# Patient Record
Sex: FEMALE | Race: WHITE | Employment: OTHER | URBAN - METROPOLITAN AREA
[De-identification: names, ages, dates, MRNs, and addresses within clinical notes are randomized per-mention and may not be internally consistent; named-entity substitution may affect disease eponyms.]

---

## 2017-05-13 ASSESSMENT — ENCOUNTER SYMPTOMS
SYNCOPE: 1
STIFFNESS: 1
POLYDIPSIA: 0
WEAKNESS: 1
NAUSEA: 1
DISTURBANCES IN COORDINATION: 1
LEG SWELLING: 0
WEIGHT LOSS: 1
NECK PAIN: 0
SORE THROAT: 0
SPEECH CHANGE: 0
COUGH DISTURBING SLEEP: 0
BACK PAIN: 1
SMELL DISTURBANCE: 0
FATIGUE: 1
PALPITATIONS: 0
HYPOTENSION: 1
EXERCISE INTOLERANCE: 1
ORTHOPNEA: 0
CONSTIPATION: 1
HEMOPTYSIS: 0
CLAUDICATION: 0
NIGHT SWEATS: 1
SLEEP DISTURBANCES DUE TO BREATHING: 0
SNORES LOUDLY: 0
MEMORY LOSS: 0
RESPIRATORY PAIN: 1
NUMBNESS: 1
VOMITING: 1
TASTE DISTURBANCE: 0
ABDOMINAL PAIN: 1
NECK MASS: 0
FEVER: 0
HOARSE VOICE: 1
ALTERED TEMPERATURE REGULATION: 1
JAUNDICE: 0
PARALYSIS: 0
COUGH: 0
RECTAL BLEEDING: 0
TINGLING: 1
TROUBLE SWALLOWING: 1
SINUS PAIN: 0
DIZZINESS: 1
POLYPHAGIA: 0
RECTAL PAIN: 0
JOINT SWELLING: 0
DYSPNEA ON EXERTION: 1
HEARTBURN: 0
MUSCLE CRAMPS: 1
SHORTNESS OF BREATH: 1
POSTURAL DYSPNEA: 0
SINUS CONGESTION: 1
DIARRHEA: 1
INCREASED ENERGY: 1
LOSS OF CONSCIOUSNESS: 1
LEG PAIN: 1
ARTHRALGIAS: 1
BOWEL INCONTINENCE: 0
SEIZURES: 0
LIGHT-HEADEDNESS: 1
WEIGHT GAIN: 0
BLOOD IN STOOL: 0
HYPERTENSION: 0
SPUTUM PRODUCTION: 0
TACHYCARDIA: 0
MUSCLE WEAKNESS: 1
WHEEZING: 1
TREMORS: 1
MYALGIAS: 1
CHILLS: 0
DECREASED APPETITE: 0
HEADACHES: 0
BLOATING: 1
HALLUCINATIONS: 0

## 2017-05-24 ENCOUNTER — OFFICE VISIT (OUTPATIENT)
Dept: ORTHOPEDICS | Facility: CLINIC | Age: 52
End: 2017-05-24

## 2017-05-24 VITALS — WEIGHT: 218.8 LBS | HEIGHT: 72 IN | BODY MASS INDEX: 29.64 KG/M2

## 2017-05-24 DIAGNOSIS — M53.3 SACROILIAC JOINT PAIN: Primary | ICD-10-CM

## 2017-05-24 RX ORDER — NAPROXEN SODIUM 220 MG
2 TABLET ORAL WEEKLY
COMMUNITY
Start: 2017-03-01 | End: 2018-07-25

## 2017-05-24 NOTE — LETTER
5/24/2017       RE: Noelle Queen  [address]  [facility] [address]     Dear Colleague,    Thank you for referring your patient, Noelle Queen, to the [facility] at [facility]. Please see a copy of my visit note below.    HISTORY OF PRESENT ILLNESS:  A 51-year-old female seen previously for bilateral SI joint pain.  She has a history of L4-S1 fusion with Dr. Hawthorne in 2012.  She continues to have chronic low back pain.  Her SI injections were performed under CT guidance in 2016 with 100% temporary pain relief.  She continues to have pain, left greater than right.  She has numbness in her legs that radiates down her medial thighs to her plantar feet.  She also has some midline tenderness in her spine which she is troubled by.  She has Parkinsonian symptoms and says that she would have trouble with compliance with postop spine or SI fusion restrictions.  This is a work comp injury.  She did DESMOND and was turned down for a fusion and recommended rhizotomy for her SI joint pain.      ALINE 68%.      PHYSICAL EXAMINATION:     VITAL SIGNS:  BMI 28.   GENERAL:  No acute distress, well-nourished.   BACK:  Tender to palpation over bilateral PSIS.  She has a positive Gaenslen's Imer, thigh thrust, VANIA bilaterally.  Negative pelvic gapping.  5/5 strength in plantarflexion, dorsiflexion, quad and psoas.  She has mild tenderness to palpation over her previously well-healed midline spine incision.  She reports subjective decreased sensation along the medial thighs, calf and plantar foot.      IMAGING:  None new obtained.      ASSESSMENT:     1.  Bilateral knee osteonecrosis.   2.  Parkinsonian symptoms.   3.  Status post spine fusion.   4.  Bilateral SI joint dysfunction.      PLAN:  Ms. Queen presents largely today to discuss her bilateral knee osteonecrosis.  She has been seen at an outside hospital and told that she may need total  knees.  She wishes to figure out what she should pursue with work comp as far as knee replacements or SI joint fusions.  These issues are separate and should be treated separately.  She also discussed her concerns about postoperative restrictions after SI joint fusion.  Based on this, she may not be a very good candidate for an SI fusion as if she is not able to stay compliant. She may have loosening of hardware, failure or a poor result.  A rhizotomy may be a reasonable option at this point.  We will give her a consult for Work Comp Clinic.  For her midline tenderness, she should see and discussed with Dr. Hawthorne.  She will follow up on an as-needed basis.      The patient was seen and examined with Dr. López who agrees with the assessment and plan.      Dictated by Benson Peacock MD, Resident  I saw and evaluated the patient on the day of the visit and I formulated the plan.    Answers for HPI/ROS submitted by the patient on 5/13/2017     Tye López MD

## 2017-05-24 NOTE — MR AVS SNAPSHOT
After Visit Summary   5/24/2017    Noelle Queen    MRN: [id number]           Patient Information     Date Of Birth          1965        Visit Information        Provider Department      5/24/2017 11:30 AM Tye López MD [facility]        Today's Diagnoses     Sacroiliac joint pain    -  1       Follow-ups after your visit        Additional Services     [facility]       Your provider has referred you to: [facility]. Please call [phone] to make an appointment.     Clinic is located: [facility] [address] [place] [address] [id number] 4th [place] [address]      Please complete the following questions:    Procedure/Referral: Referral Only -  Comprehensive Evaluation and Management    What is your diagnosis for the patient's pain? SacroIliitis    What are your specific questions for the pain specialist? Eval. For poss.  RFA    Are there any red flags that may impact the assessment or management of the patient? None    REGARDING OPIOID MEDICATIONS:  We will always address appropriateness of opioid pain medications. We do not prescribe on the patient's first visit and generally will not take on a prescribing role for stable chronic pain. When we do take on prescribing of opioids for chronic pain, it is in collaboration with the referring physician for an determined period of time (usually weeks to months), with an expectation that the primary physician or provider will assume the prescribing role if medications are effective at stable doses with demonstrated compliance.  Therefore, please do not assume that your prescribing responsibilities end on the day of pain clinic consultation.  Is this agreeable to you? YES      Please be aware that coverage of these services is subject to the terms and limitations of your health insurance plan.  Call member  services at your health plan with any benefit or coverage questions.      Please bring the following with you to your appointment or have sent to the [facility]:    (1) Any X-Rays, CTs or MRIs which have been performed that are not in Epic.  Contact the facility where they were done to arrange for  prior to your scheduled appointment.  Any new CT, MRI or other procedures ordered by your specialist must be performed at a [facility] facility or coordinated by your clinic's referral office.    (2) List of current medications   (3) This referral request   (4) Any documents/labs given to you for this referral                  Your next 10 appointments already scheduled     Jun 19, 2017 11:50 AM CDT   (Arrive by 11:35 AM)   New Patient Visit with Adalgisa Perry MD   [facility] ([facility])    [address] [place] [id number] [phone]              Future tests that were ordered for you today     Open Future Orders        Priority Expected Expires Ordered    [facility] REFERRAL Routine 5/24/2017 5/24/2018 5/24/2017            Who to contact     Please call your clinic at [phone] to:    Ask questions about your health    Make or cancel appointments    Discuss your medicines    Learn about your test results    Speak to your doctor   If you have compliments or concerns about an experience at your clinic, or if you wish to file a complaint, please contact [facility] Patient Relations at [phone] or email us at [email or web address]         Additional Information About Your Visit        [employer] Information     [employer] gives you secure access to your electronic health record. If you see a primary care provider, you can also send messages to your care team and make appointments. If you have questions, please call your primary care clinic.  If you do not have  "a primary care provider, please call [phone] and they will assist you.      [employer] is an electronic gateway that provides easy, online access to your medical records. With [employer], you can request a clinic appointment, read your test results, renew a prescription or communicate with your care team.     To access your existing account, please contact your [facility] or call [phone] for assistance.        Care EveryWhere ID     This is your Care EveryWhere ID. This could be used by other organizations to access your [place] medical records  [id number]        Your Vitals Were     Height BMI (Body Mass Index)                1.86 m (6' 1.23\") 28.69 kg/m2           Blood Pressure from Last 3 Encounters:   09/16/14 105/66   03/25/14 112/80   12/06/13 103/80    Weight from Last 3 Encounters:   05/24/17 99.2 kg (218 lb 12.8 oz)   06/08/16 95.7 kg (211 lb)   12/30/15 93.9 kg (207 lb)               Primary Care Provider Office Phone # Fax #    Grant Wise [phone]       [facility] [address] [place] [address]        Thank you!     Thank you for choosing [facility]  for your care. Our goal is always to provide you with excellent care. Hearing back from our patients is one way we can continue to improve our services. Please take a few minutes to complete the written survey that you may receive in the mail after your visit with us. Thank you!             Your Updated Medication List - Protect others around you: Learn how to safely use, store and throw away your medicines at www.disposemymeds.org.          This list is accurate as of: 5/24/17  1:53 PM.  Always use your most recent med list.                   Brand Name Dispense Instructions for use    aspirin 81 MG tablet      Take 81 mg by mouth daily       blood glucose monitoring lancets      1 each by In Vitro route Use to test blood sugar 3 times daily or as " directed.       CLONAZEPAM PO      Take 1 mg by mouth At Bedtime       diazepam 5 MG tablet    VALIUM     Take 5 mg by mouth as needed.       DILAUDID 2 MG tablet   Generic drug:  HYDROmorphone      Take 2 mg by mouth as needed.       fluticasone 50 MCG/ACT spray    FLONASE     Spray 2 sprays into both nostrils daily       IBUPROFEN PO          MIDODRINE HCL PO          MIRALAX PO      Take 1 tablet by mouth daily as needed       naproxen sodium 220 MG tablet    ANAPROX     2 tablets once a week       nortriptyline 25 MG capsule    PAMELOR         OMEPRAZOLE PO      Take 20 mg by mouth       * PARCOPA  MG per ODT tab   Generic drug:  carbidopa-levodopa      Take 1 tablet by mouth 3 times daily       * SINEMET CR  MG per CR tablet   Generic drug:  carbidopa-levodopa          progesterone 100 MG    ENDOMETRIN         propranolol 10 MG tablet    INDERAL     Take 10 mg by mouth as needed.       senna-docusate 8.6-50 MG per tablet    SENOKOT-S;PERICOLACE     Take 1 tablet by mouth 4 times daily       tretinoin 0.1 % cream    RETIN-A     Apply topically as needed       triamcinolone 0.1 % cream    KENALOG     Apply topically 2 times daily       ZENPEP 45378 UNITS Cpep   Generic drug:  amylase-lipase-protease          ZOFRAN ODT 4 MG ODT tab   Generic drug:  ondansetron      Take 1 tablet by mouth every 8 hours. 30 minutes before meals.       * Notice:  This list has 2 medication(s) that are the same as other medications prescribed for you. Read the directions carefully, and ask your doctor or other care provider to review them with you.

## 2017-05-24 NOTE — PROGRESS NOTES
HISTORY OF PRESENT ILLNESS:  A 51-year-old female seen previously for bilateral SI joint pain.  She has a history of L4-S1 fusion with Dr. Hawthorne in 2012.  She continues to have chronic low back pain.  Her SI injections were performed under CT guidance in 2016 with 100% temporary pain relief.  She continues to have pain, left greater than right.  She has numbness in her legs that radiates down her medial thighs to her plantar feet.  She also has some midline tenderness in her spine which she is troubled by.  She has Parkinsonian symptoms and says that she would have trouble with compliance with postop spine or SI fusion restrictions.  This is a work comp injury.  She did DESMOND and was turned down for a fusion and recommended rhizotomy for her SI joint pain.      ALINE 68%.      PHYSICAL EXAMINATION:     VITAL SIGNS:  BMI 28.   GENERAL:  No acute distress, well-nourished.   BACK:  Tender to palpation over bilateral PSIS.  She has a positive Gaenslen's Imer, thigh thrust, VANIA bilaterally.  Negative pelvic gapping.  5/5 strength in plantarflexion, dorsiflexion, quad and psoas.  She has mild tenderness to palpation over her previously well-healed midline spine incision.  She reports subjective decreased sensation along the medial thighs, calf and plantar foot.      IMAGING:  None new obtained.      ASSESSMENT:     1.  Bilateral knee osteonecrosis.   2.  Parkinsonian symptoms.   3.  Status post spine fusion.   4.  Bilateral SI joint dysfunction.      PLAN:  Ms. Queen presents largely today to discuss her bilateral knee osteonecrosis.  She has been seen at an outside hospital and told that she may need total knees.  She wishes to figure out what she should pursue with work comp as far as knee replacements or SI joint fusions.  These issues are separate and should be treated separately.  She also discussed her concerns about postoperative restrictions after SI joint fusion.  Based on this, she may not be a very good  candidate for an SI fusion as if she is not able to stay compliant. She may have loosening of hardware, failure or a poor result.  A rhizotomy may be a reasonable option at this point.  We will give her a consult for Work Comp Clinic.  For her midline tenderness, she should see and discussed with Dr. Hawthorne.  She will follow up on an as-needed basis.      The patient was seen and examined with Dr. López who agrees with the assessment and plan.      Dictated by Benson Peacock MD, Resident  I saw and evaluated the patient on the day of the visit and I formulated the plan.  Tye López MD        Answers for HPI/ROS submitted by the patient on 5/13/2017   General Symptoms: Yes  Skin Symptoms: No  HENT Symptoms: Yes  EYE SYMPTOMS: No  HEART SYMPTOMS: Yes  LUNG SYMPTOMS: Yes  INTESTINAL SYMPTOMS: Yes  URINARY SYMPTOMS: No  GYNECOLOGIC SYMPTOMS: No  BREAST SYMPTOMS: No  SKELETAL SYMPTOMS: Yes  BLOOD SYMPTOMS: No  NERVOUS SYSTEM SYMPTOMS: Yes  MENTAL HEALTH SYMPTOMS: No  Fever: No  Loss of appetite: No  Weight loss: Yes  Weight gain: No  Fatigue: Yes  Night sweats: Yes  Chills: No  Increased stress: No  Excessive hunger: No  Excessive thirst: No  Feeling hot or cold when others believe the temperature is normal: Yes  Loss of height: No  Post-operative complications: Yes  Surgical site pain: Yes  Hallucinations: No  Change in or Loss of Energy: Yes  Hyperactivity: No  Confusion: No  Ear pain: No  Ear discharge: No  Hearing loss: No  Tinnitus: Yes  Nosebleeds: No  Congestion: Yes  Sinus pain: No  Trouble swallowing: Yes   Voice hoarseness: Yes  Mouth sores: No  Sore throat: No  Tooth pain: No  Gum tenderness: No  Bleeding gums: No  Change in taste: No  Change in sense of smell: No  Dry mouth: Yes  Hearing aid used: No  Neck lump: No  Cough: No  Sputum or phlegm: No  Coughing up blood: No  Difficulty breating or shortness of breath: Yes  Snoring: No  Wheezing: Yes  Difficulty breathing on exertion: Yes  Respiratory  pain: Yes  Nighttime Cough: No  Difficulty breathing when lying flat: No  Chest pain or pressure: No  Fast or irregular heartbeat: No  Pain in legs with walking: Yes  Swelling in feet or ankles: No  Trouble breathing while lying down: No  Fingers or Toes appear blue: No  High blood pressure: No  Low blood pressure: Yes  Fainting: Yes  Murmurs: No  Chest pain on exertion: No  Chest pain at rest: No  Cramping pain in leg during exercise: No  Pacemaker: No  Varicose veins: No  Edema or swelling: No  Fast heart beat: No  Wake up at night with shortness of breath: No  Heart flutters: No  Light-headedness: Yes  Exercise intolerance: Yes  Heart burn or indigestion: No  Nausea: Yes  Vomiting: Yes  Abdominal pain: Yes  Bloating: Yes  Constipation: Yes  Diarrhea: Yes  Blood in stool: No  Black stools: No  Rectal or Anal pain: No  Fecal incontinence: No  Rectal bleeding: No  Yellowing of skin or eyes: No  Vomit with blood: No  Change in stools: No  Hemorrhoids: No  Back pain: Yes  Muscle aches: Yes  Neck pain: No  Swollen joints: No  Joint pain: Yes  Bone pain: Yes  Muscle cramps: Yes  Muscle weakness: Yes  Joint stiffness: Yes  Bone fracture: Yes  Trouble with coordination: Yes  Dizziness or trouble with balance: Yes  Fainting or black-out spells: Yes  Memory loss: No  Headache: No  Seizures: No  Speech problems: No  Tingling: Yes  Tremor: Yes  Weakness: Yes  Difficulty walking: Yes  Paralysis: No  Numbness: Yes

## 2017-06-03 ENCOUNTER — HEALTH MAINTENANCE LETTER (OUTPATIENT)
Age: 52
End: 2017-06-03

## 2017-06-08 ENCOUNTER — PRE VISIT (OUTPATIENT)
Dept: ANESTHESIOLOGY | Facility: CLINIC | Age: 52
End: 2017-06-08

## 2017-06-08 NOTE — TELEPHONE ENCOUNTER
1.  Date/reason for appt: 6/19/17 - SI Joint Pain  2.  Referring provider: Dr. López  3.  Call to patient (Yes / No - short description): no, pt is referred  4.  Previous care at / records requested from:   - [facility] -- Records and imaging in [facility]/pacs with Dr. López   - [facility] -- Records received, will forward to [facility] -- Records received, will forward to clinic   - [facility] -- Records received, will forward to clinic. Imaging in [facility]

## 2017-06-19 ASSESSMENT — ANXIETY QUESTIONNAIRES
6. BECOMING EASILY ANNOYED OR IRRITABLE: NOT AT ALL
3. WORRYING TOO MUCH ABOUT DIFFERENT THINGS: NOT AT ALL
1. FEELING NERVOUS, ANXIOUS, OR ON EDGE: NOT AT ALL
4. TROUBLE RELAXING: NOT AT ALL
5. BEING SO RESTLESS THAT IT IS HARD TO SIT STILL: NOT AT ALL
GAD7 TOTAL SCORE: 0
2. NOT BEING ABLE TO STOP OR CONTROL WORRYING: NOT AT ALL
7. FEELING AFRAID AS IF SOMETHING AWFUL MIGHT HAPPEN: NOT AT ALL

## 2017-06-20 ASSESSMENT — ENCOUNTER SYMPTOMS
COUGH: 0
COUGH DISTURBING SLEEP: 0
ARTHRALGIAS: 1
NUMBNESS: 1
DIARRHEA: 1
WHEEZING: 1
BLOOD IN STOOL: 0
JOINT SWELLING: 1
PALPITATIONS: 0
SYNCOPE: 1
SINUS CONGESTION: 0
RESPIRATORY PAIN: 1
EXERCISE INTOLERANCE: 1
BOWEL INCONTINENCE: 0
BACK PAIN: 1
TINGLING: 1
EYE REDNESS: 0
NECK MASS: 0
JAUNDICE: 0
SHORTNESS OF BREATH: 1
TREMORS: 1
HYPOTENSION: 1
MUSCLE WEAKNESS: 1
POSTURAL DYSPNEA: 0
LEG PAIN: 1
RECTAL BLEEDING: 0
PARALYSIS: 0
EYE PAIN: 0
HEADACHES: 0
SPEECH CHANGE: 0
MYALGIAS: 0
SNORES LOUDLY: 0
HYPERTENSION: 0
LIGHT-HEADEDNESS: 1
BLOATING: 1
DOUBLE VISION: 0
TROUBLE SWALLOWING: 1
SORE THROAT: 0
RECTAL PAIN: 0
CLAUDICATION: 0
LOSS OF CONSCIOUSNESS: 1
HEARTBURN: 0
ABDOMINAL PAIN: 1
DYSPNEA ON EXERTION: 1
MUSCLE CRAMPS: 1
TACHYCARDIA: 1
CONSTIPATION: 1
TASTE DISTURBANCE: 0
DISTURBANCES IN COORDINATION: 1
WEAKNESS: 1
STIFFNESS: 1
SEIZURES: 0
SINUS PAIN: 1
SMELL DISTURBANCE: 0
NAUSEA: 1
SPUTUM PRODUCTION: 0
HOARSE VOICE: 1
LEG SWELLING: 0
ORTHOPNEA: 0
MEMORY LOSS: 0
VOMITING: 1
NECK PAIN: 0
SLEEP DISTURBANCES DUE TO BREATHING: 0
EYE WATERING: 0
HEMOPTYSIS: 0
DIZZINESS: 1
EYE IRRITATION: 0

## 2017-06-20 ASSESSMENT — ANXIETY QUESTIONNAIRES
GAD7 TOTAL SCORE: 0
3. WORRYING TOO MUCH ABOUT DIFFERENT THINGS: NOT AT ALL
6. BECOMING EASILY ANNOYED OR IRRITABLE: NOT AT ALL
4. TROUBLE RELAXING: NOT AT ALL
5. BEING SO RESTLESS THAT IT IS HARD TO SIT STILL: NOT AT ALL
GAD7 TOTAL SCORE: 0
1. FEELING NERVOUS, ANXIOUS, OR ON EDGE: NOT AT ALL
7. FEELING AFRAID AS IF SOMETHING AWFUL MIGHT HAPPEN: NOT AT ALL
7. FEELING AFRAID AS IF SOMETHING AWFUL MIGHT HAPPEN: NOT AT ALL
2. NOT BEING ABLE TO STOP OR CONTROL WORRYING: NOT AT ALL
GAD7 TOTAL SCORE: 0

## 2017-06-23 ENCOUNTER — TELEPHONE (OUTPATIENT)
Dept: ANESTHESIOLOGY | Facility: CLINIC | Age: 52
End: 2017-06-23

## 2017-06-23 NOTE — TELEPHONE ENCOUNTER
Reminder call placed to pt.   Pt reminded of Provider, date and time of the appointment.   Pt was asked to arrive 15 minutes early to fill out the questionnaires.   Clinic phone number provided if pt needed to reschedule.     Emani Amaro, CMA

## 2017-06-26 ENCOUNTER — OFFICE VISIT (OUTPATIENT)
Dept: ANESTHESIOLOGY | Facility: CLINIC | Age: 52
End: 2017-06-26

## 2017-06-26 VITALS
WEIGHT: 215 LBS | BODY MASS INDEX: 29.12 KG/M2 | HEIGHT: 72 IN | DIASTOLIC BLOOD PRESSURE: 96 MMHG | HEART RATE: 110 BPM | SYSTOLIC BLOOD PRESSURE: 147 MMHG | OXYGEN SATURATION: 96 %

## 2017-06-26 DIAGNOSIS — M53.3 SACROILIAC JOINT PAIN: ICD-10-CM

## 2017-06-26 DIAGNOSIS — Z98.1 S/P LUMBAR SPINAL FUSION: ICD-10-CM

## 2017-06-26 DIAGNOSIS — M46.1 SACROILIITIS (H): Primary | ICD-10-CM

## 2017-06-26 ASSESSMENT — ENCOUNTER SYMPTOMS
TREMORS: 1
WEAKNESS: 1
SPEECH CHANGE: 0
VOMITING: 1
DIZZINESS: 1
CONSTIPATION: 1
DOUBLE VISION: 0
CLAUDICATION: 0
EYE REDNESS: 0
NAUSEA: 1
TINGLING: 1
COUGH: 0
MYALGIAS: 0
HEARTBURN: 0
BLOOD IN STOOL: 0
DIARRHEA: 1
SHORTNESS OF BREATH: 1
MEMORY LOSS: 0
ABDOMINAL PAIN: 1
HEADACHES: 0
WHEEZING: 1
PALPITATIONS: 0
NECK PAIN: 0
HEMOPTYSIS: 0
SEIZURES: 0
BACK PAIN: 1
LOSS OF CONSCIOUSNESS: 1
SORE THROAT: 0
ORTHOPNEA: 0
EYE PAIN: 0
SPUTUM PRODUCTION: 0

## 2017-06-26 ASSESSMENT — PAIN SCALES - GENERAL: PAINLEVEL: MODERATE PAIN (5)

## 2017-06-26 NOTE — PATIENT INSTRUCTIONS
1. Start taking Ibuprofen 400 mg every 6-8 hours for pain or discomfort.      2.Please call Chandrika at [phone] to schedule your procedure. She is available Monday - Friday from 9-5:30pm, if she is unavailable to take your call, please leave her a voice message with your name, birth date, and phone number and she will call you back.    Your procedure: bilateral diagnostic SI joint injection     On the day of the procedure  1. Arrive 1 hour earlier than your scheduled time, to the [facility]  Address: [address] [place] [address], [place] [address]  2. Check in on the 5th floor for your procedure    A nurse will call you 2 weeks after your procedure to follow up.    If you must reschedule your procedure more than two times, you must follow up in clinic before rescheduling again.      Patient Pre-Procedure Instructions    CAUTION - FAILURE TO FOLLOW THESE PRE-PROCEDURE INSTRUCTIONS WILL RESULT IN YOUR PROCEDURE BEING RESCHEDULED.      Pregnancy  If you are pregnant, or think that you may be pregnant, please notify our staff. This may or may not affect the ability to perform the procedure.     You MUST have a  TO TAKE YOU HOME after your procedure. Transportation by Taxi or Para-transit must have a responsible adult accompany you home (other than the ). Travel by bus or light rail is not acceptable transportation. You must provide your 's full name and contact number at time of check in.   Fasting Protocol You may have NOTHING SOLID TO EAT FOR 8 HOURS prior to arrival at the procedure area.   Broth and candy are considered solid food and require an eight hour fast.   You may have CLEAR LIQUIDS UP TO 2 HOURS prior to arrival at the clinic.   Clear liquids include water, clear fruit juice (no pulp) carbonated beverages, ice, black coffee, black tea (no milk or cream), chewing gum (un-swallowed), and/or clear jello (no fruit or milk). No alcohol containing beverages.    Medications If you take any medications,  DO NOT STOP. Take your medications as usual the morning of your procedure with a sip of water AT LEAST 2 HOURS PRIOR TO ARRIVAL.    Antibiotics If you are currently taking antibiotics, you must complete the entire dose 7 days prior to your scheduled procedure. You must be clear of any signs or symptoms of infection. If you begin antibiotics, please contact our clinic for instructions.   Fever, Chills, or Rash If you experience a fever of higher than 100 degrees, chills, rash, or open wounds during the one week prior to your procedure, please call the clinic.         Medication Hold List  **Patients under Cardiology/Neurology care should consult their provider prior to the pain procedure to verify pre-procedure medication instructions. The information below contains general guidelines.**    Blood Thinners If you are taking daily ASPIRIN, PLAVIX, OR OTHER BLOOD THINNERS SUCH AS COUMADIN/WARFARIN, we will need your prescribing doctor to sign a release permitting you to stop these medications. Once approved by your prescribing doctor - STOP ALL BLOOD THINNERS BASED ON THE TIME TABLE BELOW PRIOR TO YOUR PROCEDURE. If you have been instructed to stop WARFARIN(COUMADIN), you must have an INR lab drawn the day before your procedure. . Your INR must be within normal limits before we can perform your injection. MEDICATIONS CAN BE RESTARTED AFTER YOUR PROCEDURE.    14 DAY HOLD  Ticlid (ticlopidine)    10 DAY HOLD  Effient (Prasugel)    3 DAY HOLD  Xarelto (rivaroxaban) 7 DAY HOLD  Anacin, Bufferin, Ecotrin, Excedrin, Aggrenox (Aspirin)  Brilinta (ticagrelor)  Coumadin (Warfarin)  Pradexa (Dabigatran)  Elmiron (Pentosan)  Plavix (Clopidogrel Bisulfate)  Pletal (Cilostazol)    24 DAY HOLD  Lovenox (enoxaparin)  Agrylin (Anagrelide)        Non-steroidal Anti-inflammatories (NSAIDs) DO NOT TAKE any non-steroidal anti-inflammatory medications (NSAIDs) listed on the table below.  MEDICATIONS CAN BE RESTARTED AFTER YOUR PROCEDURE. Celebrex is OK to take and does not need to be discontinued.     Medications to stop:  3 DAY HOLD  Advil, Motrin (Ibuprofen)  Arthrotec (diciofenac sodium/misoprostol)  Clinoril (Sulindac)  Indocin (Indomethacin)  Lodine (Etodolac)  Toradol (Ketorolac)  Vicoprofen (Hydrocodone and Ibuprofen)  Voltaren (Diclotenac)    14 DAY HOLD  Daypro (Oxaprozin)  Feldene (Piroxicam)   7 DAY HOLD  Aleve (Naproxen sodium)  Darvon compound (contains aspirin)  Naprosyn (Naproxen)  Norgesic Forte (contains aspirin)  Mobic (Meloxicam)  Oruvall (Ketoprofen)  Percodan (contains aspirin)  Relafen (Nabumetone)  Salsalate  Trilisate  Vitamin E (more than 400 mg per day)  Any medication containing aspirin                  To speak with a nurse, schedule/reschedule/cancel a clinic appointment, or request a medication refill call: [phone]     You can also reach us by Chorus: [email or web address]    For refills, please call on Monday, 1 week before your medication runs out. The doctors are not always in clinic, so this gives us time to get your prescriptions ready.  Please let us know the name of the medication you are requesting a refill of.

## 2017-06-26 NOTE — LETTER
6/26/2017       RE: Noelle Queen  [address]  [facility] [address]     Dear Colleague,    Thank you for referring your patient, Noelle Queen, to the [facility] at [facility]. Please see a copy of my visit note below.    [facility] Comprehensive Chronic Pain [facility] : Consultation Note    Patient: Noelle Queen Age: 52 year old   MRN: [id number] Referred by:  Maribel     Date of Visit: June 26, 2017    Reason for consultation:    Noelle Queen is a 52 year old female who is seen in consultation today at the request of her provider,Dr. López for a comprehensive evaluation and management of pain.  Primary Care Provider is Grant Wise.  Opiate pain medications are being prescribed by - Todd.    Chief complaints:  Lower back pain     History of Present illness:     Noelle Queen is a 52 year old female with pain history as described below:     Location: bilateral lower back   Laterality: bilateral  Quality: dull aching   Radiation: thigh   Duration: 3-4 years   Severity: 6-8/10  Aggravating factors include: activities,   Relieving factors include: si joint injection,   Any bowel or bladder incontinence: none  Other associated symptoms: none    The patient has a medical history significant for  bilateral SI joint pain.  The patient did have a previous spinal fusion L4 to S1 with Dr. Hawthorne in 2012 and had gone on to fusion with this based on her available imaging studies.   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  In addition to her L4 to S1 fusion, she had a lumbar hemilaminectomy in 2007. She reportedly has  decreased sensation below the level of her knee. Unknown etiology. She had an EMG done this at [facility] system with Dr. Garcia, which was  reportedly normal.   She is here today to discuss treatment options for  her bilateral SI joint pain, which she describes as sharp and burning in nature, relieved with lying down and ice, aggravated with sitting and standing activities. She is specifically interested in SI joint radiofrequency ablation. She met with Dr. López and was told that her she is not good candidate for SI joint fusion. Xray  AP  And lateral view of the pelvis show anterior posterior fusion L4 to the sacrum with anterior interbody fusion devices in place. Bilateral posterior pedicle screws L4-L5 and S1. There is some mild sclerosis on the iliac side of the SI joints bilaterally consistent with sacroiliitis.       [place] Prescription Monitoring Program:   Reviewed. No concerns    Review of Systems:  Review of Systems   HENT: Positive for tinnitus. Negative for ear discharge, ear pain, hearing loss, nosebleeds and sore throat.    Eyes: Negative for double vision, pain and redness.   Respiratory: Positive for shortness of breath and wheezing. Negative for cough, hemoptysis and sputum production.    Cardiovascular: Negative for chest pain, palpitations, orthopnea and claudication.   Gastrointestinal: Positive for abdominal pain, constipation, diarrhea, nausea and vomiting. Negative for blood in stool, heartburn and melena.   Musculoskeletal: Positive for back pain. Negative for myalgias and neck pain.   Neurological: Positive for dizziness, tingling, tremors, loss of consciousness and weakness. Negative for speech change, seizures and headaches.   Psychiatric/Behavioral: Negative for memory loss.     Past Medical History:  No past medical history on file.    Past Surgical History:  No past surgical history on file.    Medications:  Current Outpatient Prescriptions   Medication Sig Dispense Refill     naproxen sodium (ANAPROX) 220 MG tablet 2 tablets once a week       amylase-lipase-protease (ZENPEP) 01068 UNITS CPEP        nortriptyline  (PAMELOR) 25 MG capsule        progesterone (ENDOMETRIN) 100 MG        carbidopa-levodopa (SINEMET CR)  MG per tablet        senna-docusate (SENOKOT-S;PERICOLACE) 8.6-50 MG per tablet Take 1 tablet by mouth 4 times daily       Polyethylene Glycol 3350 (MIRALAX PO) Take 1 tablet by mouth daily as needed       fluticasone (FLONASE) 50 MCG/ACT nasal spray Spray 2 sprays into both nostrils daily       OMEPRAZOLE PO Take 20 mg by mouth       CLONAZEPAM PO Take 1 mg by mouth At Bedtime        blood glucose monitoring (ULTRA THIN 30G) lancets 1 each by In Vitro route Use to test blood sugar 3 times daily or as directed.       IBUPROFEN PO        triamcinolone (KENALOG) 0.1 % cream Apply topically 2 times daily       aspirin 81 MG tablet Take 81 mg by mouth daily       tretinoin (RETIN-A) 0.1 % cream Apply topically as needed        carbidopa-levodopa (PARCOPA)  MG per disintegrating tablet Take 1 tablet by mouth 3 times daily        MIDODRINE HCL PO        HYDROmorphone (DILAUDID) 2 MG tablet Take 2 mg by mouth as needed.       diazepam (VALIUM) 5 MG tablet Take 5 mg by mouth as needed.       propranolol (INDERAL) 10 MG tablet Take 10 mg by mouth as needed.       ondansetron (ZOFRAN ODT) 4 MG disintegrating tablet Take 1 tablet by mouth every 8 hours. 30 minutes before meals.        [DISCONTINUED] dexamethasone (DECADRON) 4 MG/ML injection Inject 1 mL (4 mg) as directed See Admin Instructions for 3 doses 3 mL 0       Allergies:  Allergies   Allergen Reactions     Fluoxetine [Serotonin Reuptake Inhibitors] Other (See Comments)     Twitching/tremor     Medrol [Methylprednisolone]      Nuvigil [Armodafinil] Other (See Comments)     tremor     Sulindac Other (See Comments)     Stomach bleed       Social History:  Social History     Social History     Marital status:      Spouse name: N/A     Number of children: N/A     Years of education: N/A     Occupational History     Not on file.     Social History Main  "Topics     Smoking status: Never Smoker     Smokeless tobacco: Not on file     Alcohol use No     Drug use: Not on file     Sexual activity: Not on file     Other Topics Concern     Not on file     Social History Narrative     Social History     Social History Narrative       Family history:  No family history on file.      Physical Exam:  Vitals:    06/26/17 1047   BP: (!) 147/96   Pulse: 110   SpO2: 96%   Weight: 97.5 kg (215 lb)   Height: 1.854 m (6' 1\")       General: Awake in no apparent distress. This patient is unaccompanied in the office .  Eyes: Sclerae are anicteric. PERRLA, EOMI   Neck: supple, no masses.   Lungs: unlabored.   Heart: regular rate and rhythm   Abdomen: soft non tender.  Extremities: Pulses are well palpable, no peripheral edema.   Musculoskeletal: All muscle groups are normal in bulk and tone. The patient changes position without pain behavior. The patient walks with a normal gait. Posture is normal. Muscle strength was rated at 5/5 in all groups in the extremities. The range of motion of the lumbar spine is normal  in all directions. The spinous processes in the cervical, thoracic, and lumbar spine are midline, with no tenderness over the paraspinous muscles and facet joints. There was marked tenderness to palpation noted over the sacroiliac joints  bilaterally.  Neurologic exam:Strength 5/5 for bilateral grasp, finger abduction, wrist extension, elbow flexion, elbow extension, shoulder abduction.  Strength 5/5 for bilateral dorsiflexion, plantarflexion, great toe extension, knee extension, hip flexion. Sensation to light touch intact decreased  lower extremities distal to knee.   Psychiatric; Normal affect.   Skin: Warm and Dry.       LABORATORY VALUES:   Recent Labs   Lab Test  10/20/10   0620  10/18/10   0530  10/16/10   0540  10/15/10   0530   NA   --    --   139  138   POTASSIUM  3.4  3.9  4.1  3.6   CHLORIDE   --    --   108  106   CO2   --    --   26  25   ANIONGAP   --    --   6  " 7   GLC   --    --   94  104*   BUN   --    --   <2*  6   CR   --    --   0.66  0.64   GARETH   --    --   8.8  8.4*       CBC RESULTS:   Recent Labs   Lab Test  10/15/10   0530   WBC  4.6   RBC  3.87   HGB  13.5   HCT  41.7   MCV  108*   MCH  34.9*   MCHC  32.4   RDW  12.3   PLT  146*     Most Recent 3 INR's:No lab results found.    Diagnostic tests:        MRI of cervical/lumbar/thoracic spine showing:    ASSESSMENT/PLAN:                             ASSESSMENT:  - Sacroiliitis (H)-Xray  AP  And lateral view of the pelvis show anterior posterior fusion L4 to the sacrum with anterior interbody fusion devices in place. Bilateral posterior pedicle screws L4-L5 and S1. There is some mild sclerosis on the iliac side of the SI joints bilaterally consistent with sacroiliitis. Her symptoms are more prominent on the left.   - S/P lumbar spinal fusion- done by Dr. Hawthorne in 2012.   - h/o Parkinson disease  - Unexplained below knee numbness. No pain. EMG normal  - H/o major depressive disorder, Cluster B personality disorder    PLAN:  1. Medications.     If you are experiencing moderate to severe pain, take 400mg of ibuprofen . You may take this  every six hours as needed.  The maximum daily dose of ibuprofen is 3200 mg.  I recommend monitoring labs every 4 months while taking these medication. The monitoring labs should include creatinine and UA to monitor the kidneys, CBC to monitor the GI tract, and AST or ALT to monitor the liver function.     2. Interventional procedures:    We discussed with the patient about SI joint injections, the patient agreed to pursue. We will schedule the patient for the injections  pending insurance approval. We discussed about SI joint RFA if she gets good pain relief with SI joint injection.     3. Labs and imaging: None needed for pain management.     4. Rehab: None indicated. The patient is encouraged to stay active and to perform exercise as tolerated.      5. Psychology: No current  needs.    6. Integrated medicine: We discussed acupuncture therapy, but the patient is not interested.      7. Disposition:  The patient is advised to call clinic for follow up appointment in 3 months or earlier clinically indicated. We will see the patient for above mentioned procedure.       Assessment will be ongoing with changes in treatment as indicated.  Benefits/risks/alternatives to treatment have been reviewed and the patient has been instructed to contact this office if they have any questions or concerns.  This plan of care has been discussed with the patient and the patient is in agreement.     TOTAL TIME: I spent 60 minutes including greater than 50% face-to-face time counseling her about her diagnosis and treatment options.     Again, thank you for allowing me to participate in the care of your patient.      Sincerely,    Adalgisa Perry MD

## 2017-06-26 NOTE — NURSING NOTE
Preoperative information was discussed with patient.Patient understands that they are responsible for scheduling interventional pain procedure Patient verbalized understanding that she had to arrive to one hour prior to appointment and needs to have a  arrive with her and take her home after the procedure. She is not able to have any solid foods per mouth 8 hours before the procedure and only clear liquids 2 hours prior to arrival. Patient also understood not to take any blood thinning medications according to the schedule she was given.  Patient verbalized understanding  Patient received AVS discharge information. Information reviewed with patient, all patient questions answered and patient verbalized understanding of information received.

## 2017-06-26 NOTE — MR AVS SNAPSHOT
After Visit Summary   6/26/2017    Noelle Queen    MRN: [id number]           Patient Information     Date Of Birth          1965        Visit Information        Provider Department      6/26/2017 10:20 AM Adalgisa Perry MD [facility]        Today's Diagnoses     Sacroiliac joint pain          Care Instructions    1. Start taking Ibuprofen 400 mg every 6-8 hours for pain or discomfort.      2.Please call Chandrika at [phone] to schedule your procedure. She is available Monday - Friday from 9-5:30pm, if she is unavailable to take your call, please leave her a voice message with your name, birth date, and phone number and she will call you back.    Your procedure: bilateral diagnostic SI joint injection     On the day of the procedure  1. Arrive 1 hour earlier than your scheduled time, to the [facility]  Address: [address] [place] [id number]  2. Check in on the 5th floor for your procedure    A nurse will call you 2 weeks after your procedure to follow up.    If you must reschedule your procedure more than two times, you must follow up in clinic before rescheduling again.      Patient Pre-Procedure Instructions    CAUTION - FAILURE TO FOLLOW THESE PRE-PROCEDURE INSTRUCTIONS WILL RESULT IN YOUR PROCEDURE BEING RESCHEDULED.      Pregnancy  If you are pregnant, or think that you may be pregnant, please notify our staff. This may or may not affect the ability to perform the procedure.     You MUST have a  TO TAKE YOU HOME after your procedure. Transportation by Taxi or Para-transit must have a responsible adult accompany you home (other than the ). Travel by bus or light rail is not acceptable transportation. You must provide your 's full name and contact number at time of check in.   Fasting Protocol You may have NOTHING SOLID TO EAT FOR 8 HOURS prior to arrival at the procedure area.    Broth and candy are considered solid food and require an eight hour fast.   You may have CLEAR LIQUIDS UP TO 2 HOURS prior to arrival at the clinic.   Clear liquids include water, clear fruit juice (no pulp) carbonated beverages, ice, black coffee, black tea (no milk or cream), chewing gum (un-swallowed), and/or clear jello (no fruit or milk). No alcohol containing beverages.   Medications If you take any medications,  DO NOT STOP. Take your medications as usual the morning of your procedure with a sip of water AT LEAST 2 HOURS PRIOR TO ARRIVAL.    Antibiotics If you are currently taking antibiotics, you must complete the entire dose 7 days prior to your scheduled procedure. You must be clear of any signs or symptoms of infection. If you begin antibiotics, please contact our clinic for instructions.   Fever, Chills, or Rash If you experience a fever of higher than 100 degrees, chills, rash, or open wounds during the one week prior to your procedure, please call the clinic.         Medication Hold List  **Patients under Cardiology/Neurology care should consult their provider prior to the pain procedure to verify pre-procedure medication instructions. The information below contains general guidelines.**    Blood Thinners If you are taking daily ASPIRIN, PLAVIX, OR OTHER BLOOD THINNERS SUCH AS COUMADIN/WARFARIN, we will need your prescribing doctor to sign a release permitting you to stop these medications. Once approved by your prescribing doctor - STOP ALL BLOOD THINNERS BASED ON THE TIME TABLE BELOW PRIOR TO YOUR PROCEDURE. If you have been instructed to stop WARFARIN(COUMADIN), you must have an INR lab drawn the day before your procedure. . Your INR must be within normal limits before we can perform your injection. MEDICATIONS CAN BE RESTARTED AFTER YOUR PROCEDURE.    14 DAY HOLD  Ticlid (ticlopidine)    10 DAY HOLD  Effient (Prasugel)    3 DAY HOLD  Xarelto (rivaroxaban) 7 DAY HOLD  Anacin, Bufferin, Ecotrin,  Excedrin, Aggrenox (Aspirin)  Brilinta (ticagrelor)  Coumadin (Warfarin)  Pradexa (Dabigatran)  Elmiron (Pentosan)  Plavix (Clopidogrel Bisulfate)  Pletal (Cilostazol)    24 DAY HOLD  Lovenox (enoxaparin)  Agrylin (Anagrelide)        Non-steroidal Anti-inflammatories (NSAIDs) DO NOT TAKE any non-steroidal anti-inflammatory medications (NSAIDs) listed on the table below. MEDICATIONS CAN BE RESTARTED AFTER YOUR PROCEDURE. Celebrex is OK to take and does not need to be discontinued.     Medications to stop:  3 DAY HOLD  Advil, Motrin (Ibuprofen)  Arthrotec (diciofenac sodium/misoprostol)  Clinoril (Sulindac)  Indocin (Indomethacin)  Lodine (Etodolac)  Toradol (Ketorolac)  Vicoprofen (Hydrocodone and Ibuprofen)  Voltaren (Diclotenac)    14 DAY HOLD  Daypro (Oxaprozin)  Feldene (Piroxicam)   7 DAY HOLD  Aleve (Naproxen sodium)  Darvon compound (contains aspirin)  Naprosyn (Naproxen)  Norgesic Forte (contains aspirin)  Mobic (Meloxicam)  Oruvall (Ketoprofen)  Percodan (contains aspirin)  Relafen (Nabumetone)  Salsalate  Trilisate  Vitamin E (more than 400 mg per day)  Any medication containing aspirin                  To speak with a nurse, schedule/reschedule/cancel a clinic appointment, or request a medication refill call: [phone]     You can also reach us by [employer]: [email or web address]    For refills, please call on Monday, 1 week before your medication runs out. The doctors are not always in clinic, so this gives us time to get your prescriptions ready.  Please let us know the name of the medication you are requesting a refill of.                                   Follow-ups after your visit        Who to contact     Please call your clinic at [phone] to:    Ask questions about your health    Make or cancel appointments    Discuss your medicines    Learn about your test results    Speak to your doctor   If you have compliments or concerns about an experience at your clinic, or if you wish  "to file a complaint, please contact [facility] Patient Relations at [phone] or email us at [email or web address]         Additional Information About Your Visit        A [employer] Information     [employer] gives you secure access to your electronic health record. If you see a primary care provider, you can also send messages to your care team and make appointments. If you have questions, please call your primary care clinic.  If you do not have a primary care provider, please call [phone] and they will assist you.      [employer] is an electronic gateway that provides easy, online access to your medical records. With [employer], you can request a clinic appointment, read your test results, renew a prescription or communicate with your care team.     To access your existing account, please contact your [facility] or call [phone] for assistance.        Care EveryWhere ID     This is your Care EveryWhere ID. This could be used by other organizations to access your [place] medical records  [id number]        Your Vitals Were     Pulse Height Pulse Oximetry BMI (Body Mass Index)          110 1.854 m (6' 1\") 96% 28.37 kg/m2         Blood Pressure from Last 3 Encounters:   06/26/17 (!) 147/96   09/16/14 105/66   03/25/14 112/80    Weight from Last 3 Encounters:   06/26/17 97.5 kg (215 lb)   05/24/17 99.2 kg (218 lb 12.8 oz)   06/08/16 95.7 kg (211 lb)              We Performed the Following     [facility]ERR[facility]        Primary Care Provider Office Phone # Fax #    Grant TORRES Frandy [phone]       [facility] [address] [place] [address]        Equal Access to Services     LEWIS CHASE : Malcolm Varghese, mary ann brown, pancho sullivan, alysia ballard. So [facility] [phone].    ATENCIÓN: Si habla español, tiene a perez disposición " servicios gratuitos de asistencia lingüística. Zabrina cam [phone].    We comply with applicable federal civil rights laws and [place] laws. We do not discriminate on the basis of race, color, national origin, age, disability sex, sexual orientation or gender identity.            Thank you!     Thank you for choosing [facility]  for your care. Our goal is always to provide you with excellent care. Hearing back from our patients is one way we can continue to improve our services. Please take a few minutes to complete the written survey that you may receive in the mail after your visit with us. Thank you!             Your Updated Medication List - Protect others around you: Learn how to safely use, store and throw away your medicines at www.disposemymeds.org.          This list is accurate as of: 6/26/17 11:50 AM.  Always use your most recent med list.                   Brand Name Dispense Instructions for use Diagnosis    aspirin 81 MG tablet      Take 81 mg by mouth daily        blood glucose monitoring lancets      1 each by In Vitro route Use to test blood sugar 3 times daily or as directed.        CLONAZEPAM PO      Take 1 mg by mouth At Bedtime        diazepam 5 MG tablet    VALIUM     Take 5 mg by mouth as needed.        DILAUDID 2 MG tablet   Generic drug:  HYDROmorphone      Take 2 mg by mouth as needed.        fluticasone 50 MCG/ACT spray    FLONASE     Spray 2 sprays into both nostrils daily    Sacroiliac joint pain       IBUPROFEN PO           MIDODRINE HCL PO           MIRALAX PO      Take 1 tablet by mouth daily as needed    Sacroiliac joint pain       naproxen sodium 220 MG tablet    ANAPROX     2 tablets once a week        nortriptyline 25 MG capsule    PAMELOR          OMEPRAZOLE PO      Take 20 mg by mouth        * PARCOPA  MG per ODT tab   Generic drug:  carbidopa-levodopa      Take 1 tablet by mouth 3 times daily        * SINEMET CR  MG per CR  tablet   Generic drug:  carbidopa-levodopa           progesterone 100 MG    ENDOMETRIN          propranolol 10 MG tablet    INDERAL     Take 10 mg by mouth as needed.        senna-docusate 8.6-50 MG per tablet    SENOKOT-S;PERICOLACE     Take 1 tablet by mouth 4 times daily    Sacroiliac joint pain       tretinoin 0.1 % cream    RETIN-A     Apply topically as needed        triamcinolone 0.1 % cream    KENALOG     Apply topically 2 times daily        ZENPEP 43508 UNITS Cpep   Generic drug:  amylase-lipase-protease           ZOFRAN ODT 4 MG ODT tab   Generic drug:  ondansetron      Take 1 tablet by mouth every 8 hours. 30 minutes before meals.        * Notice:  This list has 2 medication(s) that are the same as other medications prescribed for you. Read the directions carefully, and ask your doctor or other care provider to review them with you.

## 2017-06-26 NOTE — PROGRESS NOTES
[facility] : Consultation Note    Patient: Noelle Queen Age: 52 year old   MRN: [id number] Referred by:  Maribel     Date of Visit: June 26, 2017    Reason for consultation:    Noelle Queen is a 52 year old female who is seen in consultation today at the request of her provider,Dr. López for a comprehensive evaluation and management of pain.  Primary Care Provider is Grant Wise  Opiate pain medications are being prescribed by - Todd.    Chief complaints:  Lower back pain     History of Present illness:     Noelle Queen is a 52 year old female with pain history as described below:     Location: bilateral lower back   Laterality: bilateral  Quality: dull aching   Radiation: thigh   Duration: 3-4 years   Severity: 6-8/10  Aggravating factors include: activities,   Relieving factors include: si joint injection,   Any bowel or bladder incontinence: none  Other associated symptoms: none    The patient has a medical history significant for  bilateral SI joint pain.  The patient did have a previous spinal fusion L4 to S1 with Dr. Hawthorne in 2012 and had gone on to fusion with this based on her available imaging studies.  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  In addition to her L4 to S1 fusion, she had a lumbar hemilaminectomy in 2007. She reportedly has  decreased sensation below the level of her knee. Unknown etiology. She had an EMG done this at [facility] system with Dr. Garcia, which was reportedly normal.   She is here today to discuss treatment options for  her bilateral SI joint pain, which she describes as sharp and burning in nature, relieved with lying down and ice, aggravated with sitting and standing activities. She is specifically interested in SI joint radiofrequency ablation. She met with Dr. López and  was told that her she is not good candidate for SI joint fusion. Xray  AP  And lateral view of the pelvis show anterior posterior fusion L4 to the sacrum with anterior interbody fusion devices in place. Bilateral posterior pedicle screws L4-L5 and S1. There is some mild sclerosis on the iliac side of the SI joints bilaterally consistent with sacroiliitis.       [place] Prescription Monitoring Program:   Reviewed. No concerns    Review of Systems:  Review of Systems   HENT: Positive for tinnitus. Negative for ear discharge, ear pain, hearing loss, nosebleeds and sore throat.    Eyes: Negative for double vision, pain and redness.   Respiratory: Positive for shortness of breath and wheezing. Negative for cough, hemoptysis and sputum production.    Cardiovascular: Negative for chest pain, palpitations, orthopnea and claudication.   Gastrointestinal: Positive for abdominal pain, constipation, diarrhea, nausea and vomiting. Negative for blood in stool, heartburn and melena.   Musculoskeletal: Positive for back pain. Negative for myalgias and neck pain.   Neurological: Positive for dizziness, tingling, tremors, loss of consciousness and weakness. Negative for speech change, seizures and headaches.   Psychiatric/Behavioral: Negative for memory loss.       Past Medical History:  No past medical history on file.    Past Surgical History:  No past surgical history on file.    Medications:  Current Outpatient Prescriptions   Medication Sig Dispense Refill     naproxen sodium (ANAPROX) 220 MG tablet 2 tablets once a week       amylase-lipase-protease (ZENPEP) 29678 UNITS CPEP        nortriptyline (PAMELOR) 25 MG capsule        progesterone (ENDOMETRIN) 100 MG        carbidopa-levodopa (SINEMET CR)  MG per tablet        senna-docusate (SENOKOT-S;PERICOLACE) 8.6-50 MG per tablet Take 1 tablet by mouth 4 times daily       Polyethylene Glycol 3350 (MIRALAX PO) Take 1 tablet by mouth daily as needed       fluticasone  (FLONASE) 50 MCG/ACT nasal spray Spray 2 sprays into both nostrils daily       OMEPRAZOLE PO Take 20 mg by mouth       CLONAZEPAM PO Take 1 mg by mouth At Bedtime        blood glucose monitoring (ULTRA THIN 30G) lancets 1 each by In Vitro route Use to test blood sugar 3 times daily or as directed.       IBUPROFEN PO        triamcinolone (KENALOG) 0.1 % cream Apply topically 2 times daily       aspirin 81 MG tablet Take 81 mg by mouth daily       tretinoin (RETIN-A) 0.1 % cream Apply topically as needed        carbidopa-levodopa (PARCOPA)  MG per disintegrating tablet Take 1 tablet by mouth 3 times daily        MIDODRINE HCL PO        HYDROmorphone (DILAUDID) 2 MG tablet Take 2 mg by mouth as needed.       diazepam (VALIUM) 5 MG tablet Take 5 mg by mouth as needed.       propranolol (INDERAL) 10 MG tablet Take 10 mg by mouth as needed.       ondansetron (ZOFRAN ODT) 4 MG disintegrating tablet Take 1 tablet by mouth every 8 hours. 30 minutes before meals.        [DISCONTINUED] dexamethasone (DECADRON) 4 MG/ML injection Inject 1 mL (4 mg) as directed See Admin Instructions for 3 doses 3 mL 0       Allergies:       Allergies   Allergen Reactions     Fluoxetine [Serotonin Reuptake Inhibitors] Other (See Comments)     Twitching/tremor     Medrol [Methylprednisolone]      Nuvigil [Armodafinil] Other (See Comments)     tremor     Sulindac Other (See Comments)     Stomach bleed       Social History:    Social History     Social History     Marital status:      Spouse name: N/A     Number of children: N/A     Years of education: N/A     Occupational History     Not on file.     Social History Main Topics     Smoking status: Never Smoker     Smokeless tobacco: Not on file     Alcohol use No     Drug use: Not on file     Sexual activity: Not on file     Other Topics Concern     Not on file     Social History Narrative     Social History     Social History Narrative         Family history:  No family history on  "file.      Physical Exam:  Vitals:    06/26/17 1047   BP: (!) 147/96   Pulse: 110   SpO2: 96%   Weight: 97.5 kg (215 lb)   Height: 1.854 m (6' 1\")       General: Awake in no apparent distress. This patient is unaccompanied in the office .  Eyes: Sclerae are anicteric. PERRLA, EOMI   Neck: supple, no masses.   Lungs: unlabored.   Heart: regular rate and rhythm   Abdomen: soft non tender.  Extremities: Pulses are well palpable, no peripheral edema.   Musculoskeletal: All muscle groups are normal in bulk and tone. The patient changes position without pain behavior. The patient walks with a normal gait. Posture is normal. Muscle strength was rated at 5/5 in all groups in the extremities. The range of motion of the lumbar spine is normal  in all directions. The spinous processes in the cervical, thoracic, and lumbar spine are midline, with no tenderness over the paraspinous muscles and facet joints. There was marked tenderness to palpation noted over the sacroiliac joints  bilaterally.  Neurologic exam:Strength 5/5 for bilateral grasp, finger abduction, wrist extension, elbow flexion, elbow extension, shoulder abduction.  Strength 5/5 for bilateral dorsiflexion, plantarflexion, great toe extension, knee extension, hip flexion. Sensation to light touch intact decreased  lower extremities distal to knee.   Psychiatric; Normal affect.   Skin: Warm and Dry.       LABORATORY VALUES:   Recent Labs   Lab Test  10/20/10   0620  10/18/10   0530  10/16/10   0540  10/15/10   0530   NA   --    --   139  138   POTASSIUM  3.4  3.9  4.1  3.6   CHLORIDE   --    --   108  106   CO2   --    --   26  25   ANIONGAP   --    --   6  7   GLC   --    --   94  104*   BUN   --    --   <2*  6   CR   --    --   0.66  0.64   GARETH   --    --   8.8  8.4*       CBC RESULTS:   Recent Labs   Lab Test  10/15/10   0530   WBC  4.6   RBC  3.87   HGB  13.5   HCT  41.7   MCV  108*   MCH  34.9*   MCHC  32.4   RDW  12.3   PLT  146*       Most Recent 3 INR's:No " lab results found.      Diagnostic tests:          MRI of cervical/lumbar/thoracic spine showing:    ASSESSMENT/PLAN:                             ASSESSMENT:       - Sacroiliitis (H)-Xray  AP  And lateral view of the pelvis show anterior posterior fusion L4 to the sacrum with anterior interbody fusion devices in place. Bilateral posterior pedicle screws L4-L5 and S1. There is some mild sclerosis on the iliac side of the SI joints bilaterally consistent with sacroiliitis. Her symptoms are more prominent on the left.   - S/P lumbar spinal fusion- done by Dr. Hawthorne in 2012.   - h/o Parkinson disease  - Unexplained below knee numbness. No pain. EMG normal  - H/o major depressive disorder, Cluster B personality disorder           PLAN:    1. Medications.     If you are experiencing moderate to severe pain, take 400mg of ibuprofen . You may take this  every six hours as needed.  The maximum daily dose of ibuprofen is 3200 mg.  I recommend monitoring labs every 4 months while taking these medication. The monitoring labs should include creatinine and UA to monitor the kidneys, CBC to monitor the GI tract, and AST or ALT to monitor the liver function.       2. Interventional procedures:    We discussed with the patient about SI joint injections, the patient agreed to pursue. We will schedule the patient for the injections  pending insurance approval. We discussed about SI joint RFA if she gets good pain relief with SI joint injection.     3. Labs and imaging: None needed for pain management.     4. Rehab: None indicated. The patient is encouraged to stay active and to perform exercise as tolerated.      5. Psychology: No current needs.    6. Integrated medicine: We discussed acupuncture therapy, but the patient is not interested.      7. Disposition:  The patient is advised to call clinic for follow up appointment in 3 months or earlier clinically indicated. We will see the patient for above mentioned procedure.        Assessment will be ongoing with changes in treatment as indicated.  Benefits/risks/alternatives to treatment have been reviewed and the patient has been instructed to contact this office if they have any questions or concerns.  This plan of care has been discussed with the patient and the patient is in agreement.     Adalgisa Perry MD, PHD      TOTAL TIME: I spent 60 minutes including greater than 50% face-to-face time counseling her about her diagnosis and treatment options.

## 2017-07-19 ENCOUNTER — TELEPHONE (OUTPATIENT)
Dept: ANESTHESIOLOGY | Facility: CLINIC | Age: 52
End: 2017-07-19

## 2017-07-19 NOTE — TELEPHONE ENCOUNTER
Patient was called and updated on the status of her workman's comp claim for SI joint infections. This RN will start the process of appealing the insurance decision.

## 2017-08-23 ENCOUNTER — TELEPHONE (OUTPATIENT)
Dept: ANESTHESIOLOGY | Facility: CLINIC | Age: 52
End: 2017-08-23

## 2017-08-23 NOTE — TELEPHONE ENCOUNTER
LPN called and spoke to pt regarding Prior Authorization for their procedure tomorrow with Dr. Perry.   Pt was informed that their PA is Still pending.  (For their Bilateral SI Joint injection- through work comp)  Pt asked: if we are going to be denied by Work Comp, is they anyway we can see if I can get the procedure approved through my other Supplemental insurance.     LPN informed pt that they would contact the PA department and ask them, and get back to the pt.     LPN call the PA department and left a VM, asking them to call back when available.    Marissa Schultz LPN

## 2017-08-23 NOTE — TELEPHONE ENCOUNTER
----- Message from Cate Ortiz sent at 8/23/2017 11:17 AM CDT -----  Regarding: Patient call  Darinel Hamny - here is the patient looking for the call back regarding the status of her approval for tomorrow. She said to use the home number. Thanks!

## 2017-08-23 NOTE — TELEPHONE ENCOUNTER
LPN was able to follow up with PA department with patient's questions. PA team stated that its best to wait for a denial, and then we can run it through the supplimental Insurance.     LPN updated pt, however pt changed their mind and decided that she did not want to run the PA through her other insurance anymore and if her current PA gets denied she would think about how she would like to proceed.     Pt stated that she would like to cancel procedure tomorrow, and will reschedule after hearing back about the PA for the Bilateral SI Joint injection.     LPN canceled procedure for 8/24/17 with Dr. Perry.    Marissa Schultz LPN

## 2017-08-25 ENCOUNTER — TELEPHONE (OUTPATIENT)
Dept: ANESTHESIOLOGY | Facility: CLINIC | Age: 52
End: 2017-08-25

## 2017-08-25 NOTE — TELEPHONE ENCOUNTER
This RN called the patient to update her that we have not heard anything on the status of her SI joint injections through workman's comp but the process is still ongoing. We will update the patient when information becomes available.

## 2017-09-27 ENCOUNTER — TELEPHONE (OUTPATIENT)
Dept: ANESTHESIOLOGY | Facility: CLINIC | Age: 52
End: 2017-09-27

## 2017-09-27 NOTE — TELEPHONE ENCOUNTER
Call back about work comp questions.  Dr. Perry will be updated.     RNCC spoke with Dr. Perry.  MD recommends pt call work comp to address the next step in the appeals process, as we have already submitted an appeal on pt's behalf.  Pt agreeable to this recommendation.  Pt asking if diagnostic blocks performed within the past three years with Dr. López can be sufficient for Dr. Perry to do the rhizotomy without repeating the dx medial branch nerve blocks.  RNCC agreed to consult with Dr. Perry and follow up with pt.

## 2017-09-27 NOTE — TELEPHONE ENCOUNTER
----- Message from Dallin Cuellar RN sent at 9/22/2017  3:02 PM CDT -----  Regarding: FW: Appeal Denied _[place]_  Contact: [phone]      ----- Message -----     From: Wilmer Melgar     Sent: 9/22/2017   1:10 PM       To: Adult Chronic Pain Nurses-[facility]  Subject: Appeal Denied _[place]_                            Noelle reports that [employer] turned down the Bilateral SI Injections ordered by Dr. Perry.  What would be the next step?  To do a 2nd Appeal?    Noelle can be reached at [phone] to discuss.

## 2017-10-25 NOTE — TELEPHONE ENCOUNTER
Call back to pt regarding questions.  Dr. Perry confirmed with RNCC that dx blocks must be done again before moving forward with a rhizotomy.  Pt updated with Dr. Perry's response.  Pt advised to contact workman's comp to discuss authorization of dx blocks.  Pt encouraged to contact clinic if needed. Pt verbalized understanding.     Argentina Morrow, RN, BSN

## 2017-11-20 LAB — MAMMOGRAM: NORMAL

## 2018-07-11 ASSESSMENT — ENCOUNTER SYMPTOMS
MUSCLE CRAMPS: 1
SMELL DISTURBANCE: 0
LEG PAIN: 0
SINUS CONGESTION: 0
MYALGIAS: 0
MUSCLE WEAKNESS: 1
SORE THROAT: 0
POLYDIPSIA: 0
NUMBNESS: 1
DECREASED APPETITE: 0
HOT FLASHES: 1
WHEEZING: 1
ORTHOPNEA: 0
HEMOPTYSIS: 0
HOARSE VOICE: 1
COUGH DISTURBING SLEEP: 0
ALTERED TEMPERATURE REGULATION: 0
LOSS OF CONSCIOUSNESS: 1
ARTHRALGIAS: 1
WEIGHT LOSS: 0
SLEEP DISTURBANCES DUE TO BREATHING: 0
TASTE DISTURBANCE: 0
PALPITATIONS: 0
DECREASED LIBIDO: 0
TROUBLE SWALLOWING: 1
SINUS PAIN: 0
FATIGUE: 1
WEAKNESS: 1
LIGHT-HEADEDNESS: 1
EXERCISE INTOLERANCE: 1
SPEECH CHANGE: 0
SHORTNESS OF BREATH: 1
HYPOTENSION: 1
POSTURAL DYSPNEA: 0
HYPERTENSION: 0
BACK PAIN: 1
SNORES LOUDLY: 0
NECK MASS: 0
WEIGHT GAIN: 0
NECK PAIN: 0
JOINT SWELLING: 1
DISTURBANCES IN COORDINATION: 1
MEMORY LOSS: 0
FEVER: 0
HEADACHES: 0
DYSPNEA ON EXERTION: 1
SPUTUM PRODUCTION: 0
DIZZINESS: 0
CHILLS: 0
SEIZURES: 0
HALLUCINATIONS: 0
INCREASED ENERGY: 0
STIFFNESS: 1
COUGH: 0
PARALYSIS: 0
TINGLING: 1
SYNCOPE: 1
NIGHT SWEATS: 1
POLYPHAGIA: 0
TREMORS: 1

## 2018-07-23 DIAGNOSIS — M54.9 BACK PAIN: Primary | ICD-10-CM

## 2018-07-25 ENCOUNTER — RADIANT APPOINTMENT (OUTPATIENT)
Dept: GENERAL RADIOLOGY | Facility: CLINIC | Age: 53
End: 2018-07-25
Attending: ORTHOPAEDIC SURGERY
Payer: OTHER MISCELLANEOUS

## 2018-07-25 ENCOUNTER — OFFICE VISIT (OUTPATIENT)
Dept: ORTHOPEDICS | Facility: CLINIC | Age: 53
End: 2018-07-25
Payer: OTHER MISCELLANEOUS

## 2018-07-25 VITALS — HEIGHT: 72 IN | BODY MASS INDEX: 29.12 KG/M2 | WEIGHT: 215 LBS

## 2018-07-25 DIAGNOSIS — M46.1 SACROILIITIS (H): Primary | ICD-10-CM

## 2018-07-25 DIAGNOSIS — M54.9 BACK PAIN: ICD-10-CM

## 2018-07-25 NOTE — NURSING NOTE
"Reason For Visit:   Chief Complaint   Patient presents with     RECHECK     WC. F/U sacroillitis. Discuss spinal fusion     Primary MD: Grant Wise  Ref. MD: Dr. Hawthorne  Neurologist: Dr. 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  Work status?  On disability.  Date of injury: 9/13/06  Type of injury: WC., stood up from a chair  Date of surgery: 2007, lumbar hemilaminectomy                           10/5/12, lumbar fusion with Dr. Hawthorne     Smoker: No        Ht 1.854 m (6' 1\")  Wt 97.5 kg (215 lb)  BMI 28.37 kg/m2    Pain Assessment  Patient Currently in Pain: Yes  0-10 Pain Scale: 7  Primary Pain Location: Buttocks  Pain Orientation: Right, Left  Pain Descriptors: Discomfort, Aching    Oswestry (ALINE) Questionnaire    OSWESTRY DISABILITY INDEX 7/11/2018   Count 9   Sum 33   Oswestry Score (%) 73.33   Some recent data might be hidden              Visual Analog Pain Scale  Back Pain Scale 0-10: 8  Right leg pain: 0  Left leg pain: 0    Promis 10 Assessment    PROMIS 10 7/11/2018   In general, would you say your health is: Good   In general, would you say your quality of life is: Fair   In general, how would you rate your physical health? Good   In general, how would you rate your mental health, including your mood and your ability to think? Very good   In general, how would you rate your satisfaction with your social activities and relationships? Fair   In general, please rate how well you carry out your usual social activities and roles Fair   To what extent are you able to carry out your everyday physical activities such as walking, climbing stairs, carrying groceries, or moving a chair? A little   How often have you been bothered by emotional problems such as feeling anxious, depressed or irritable? Never   How would you rate your fatigue on average? Moderate   How would you rate your pain on average?   0 = No Pain  to  10 = Worst Imaginable Pain 9   Global Physical Health Score : " Raw Score -   Global Mental Health Score : Raw Score -   Total (Physical + Mental Health Score) -   In general, would you say your health is: 3   In general, would you say your quality of life is: 2   In general, how would you rate your physical health? 3   In general, how would you rate your mental health, including your mood and your ability to think? 4   In general, how would you rate your satisfaction with your social activities and relationships? 2   In general, please rate how well you carry out your usual social activities and roles. (This includes activities at home, at work and in your community, and responsibilities as a parent, child, spouse, employee, friend, etc.) 2   To what extent are you able to carry out your everyday physical activities such as walking, climbing stairs, carrying groceries, or moving a chair? 2   In the past 7 days, how often have you been bothered by emotional problems such as feeling anxious, depressed, or irritable? 1   In the past 7 days, how would you rate your fatigue on average? 3   In the past 7 days, how would you rate your pain on average, where 0 means no pain, and 10 means worst imaginable pain? 9   Global Mental Health Score 13   Global Physical Health Score 10   PROMIS TOTAL - SUBSCORES 23   Some recent data might be hidden                Isabella Ortega LPN

## 2018-07-25 NOTE — PROGRESS NOTES
[facility]  Orthopedics  Tye López MD  2018     Name: Noelle Queen  MRN: [id number]  Age: 53 year old  : 1965  Referring provider: Referred Self    REASON FOR VISIT: Bilateral low back and SI joint pain    PRIMARY CARE PHYSICIAN: Grant Wise    HISTORY OF PRESENT ILLNESS: Noelle Queen is a 53 year old female who is seen as a follow up for bilateral low back and SI joint pain. She has a history of L4-S1 fusion in  by Dr. Hawthorne. Patient was last seen in Spine clinic on 17 for ongoing bilateral low back and SI joint pain. Since then, she reports having dorsal rami blocks on  and , S1 - S4. Was planning to have an RFA at that time, however her workers comp would not cover this. Although, patient now reports that her workers comp will be able to cover an SI joint surgery. 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 Her pain is worse on the left compared to the right. Patient also has complaints of intermittent numbness and tingling down bilateral lower extremities, exacerbated by prolonged standing.     She also endorses an increase of fainting episodes as of late, reporting that she loses consciousness secondary to pain about 3-5 times a month. Of note, she is on 6 mg oral dilaudid daily.     In regards to surgery, patient endorses being heterozygous for factor V Leiden; she takes a baby aspirin daily.       Oswestry score: 73.33  PROMIS Score: 23  Pain scale: 7    PHYSICAL EXAMINATION:No new exam performed today.    IMAGING: XR Pelvis G/E 3 Views  COMPARISON FILMS: 16  RADIOGRAPHIC FINDINGS: Full radiological report in chart. Upsloped sacral dysmorphism. The results were reviewed with the patient    CLINICAL ASSESSMENT:   SI joint dysfunction    PLAN:   An 53 year old female with ongoing SI joint dysfunction. As her insurance is now willing to cover an SI  surgical procedure, I think that plan at this time is for surgical intervention with a left SI fusion. I discussed with the patient the risks of surgery, including, but not limited to death, infection, dural tear, cauda equina syndrome, paralysis, nerve injury, and non-resolution of symptoms and the patient wishes to proceed. I would like her to be seen in the PAC clinic to assess her risks for surgery, especially given her complaints of worsening fainting episodes and with her history of being heterozygous for factor V Leiden. The patient will be scheduled at the soonest possible time. Patient verbalized their understanding and agreed with the plan.     All questions were addressed and answered.     Answers for HPI/ROS submitted by the patient on 7/11/2018   General Symptoms: Yes  Skin Symptoms: No  HENT Symptoms: Yes  EYE SYMPTOMS: No  HEART SYMPTOMS: Yes  LUNG SYMPTOMS: Yes  INTESTINAL SYMPTOMS: No  URINARY SYMPTOMS: No  GYNECOLOGIC SYMPTOMS: Yes  BREAST SYMPTOMS: No  SKELETAL SYMPTOMS: Yes  BLOOD SYMPTOMS: No  NERVOUS SYSTEM SYMPTOMS: Yes  MENTAL HEALTH SYMPTOMS: No  Fever: No  Loss of appetite: No  Weight loss: No  Weight gain: No  Fatigue: Yes  Night sweats: Yes  Chills: No  Increased stress: No  Excessive hunger: No  Excessive thirst: No  Feeling hot or cold when others believe the temperature is normal: No  Loss of height: No  Post-operative complications: No  Surgical site pain: No  Hallucinations: No  Change in or Loss of Energy: No  Hyperactivity: No  Confusion: No  Ear pain: No  Ear discharge: No  Hearing loss: No  Tinnitus: Yes  Nosebleeds: No  Congestion: No  Sinus pain: No  Trouble swallowing: Yes   Voice hoarseness: Yes  Mouth sores: No  Sore throat: No  Tooth pain: No  Gum tenderness: No  Bleeding gums: No  Change in taste: No  Change in sense of smell: No  Dry mouth: No  Hearing aid used: No  Neck lump: No  Cough: No  Sputum or phlegm: No  Coughing up blood: No  Difficulty breathing or shortness of  breath: Yes  Snoring: No  Wheezing: Yes  Difficulty breathing on exertion: Yes  Nighttime Cough: No  Difficulty breathing when lying flat: No  Chest pain or pressure: No  Fast or irregular heartbeat: No  Pain in legs with walking: No  Trouble breathing while lying down: No  Fingers or toes appear blue: No  High blood pressure: No  Low blood pressure: Yes  Fainting: Yes  Murmurs: No  Pacemaker: No  Varicose veins: No  Edema or swelling: No  Wake up at night with shortness of breath: No  Light-headedness: Yes  Exercise intolerance: Yes  Back pain: Yes  Muscle aches: No  Neck pain: No  Swollen joints: Yes  Joint pain: Yes  Bone pain: Yes  Muscle cramps: Yes  Muscle weakness: Yes  Joint stiffness: Yes  Bone fracture: No  Trouble with coordination: Yes  Dizziness or trouble with balance: No  Fainting or black-out spells: Yes  Memory loss: No  Headache: No  Seizures: No  Speech problems: No  Tingling: Yes  Tremor: Yes  Weakness: Yes  Difficulty walking: Yes  Paralysis: No  Numbness: Yes  Bleeding or spotting between periods: No  Heavy or painful periods: No  Irregular periods: No  Vaginal discharge: No  Hot flashes: Yes  Vaginal dryness: No  Genital ulcers: No  Reduced libido: No  Painful intercourse: No  Difficulty with sexual arousal: No  Post-menopausal bleeding: No      Scribe Disclosure:   I, Sunil Valenzuela, am serving as a scribe to document services personally performed by Tye López MD at this visit, based upon the provider's statements to me. All documentation has been reviewed by the aforementioned provider prior to being entered into the official medical record.     Portions of this medical record were completed by a scribe. UPON MY REVIEW AND AUTHENTICATION BY ELECTRONIC SIGNATURE, this confirms (a) I performed the applicable clinical services, and (b) the record is accurate.      Total contact time >25 min    Respectfully,  Tye López MD    CC  Copy to patient     [address] [facility]  [place]  [facility] [address]

## 2018-07-25 NOTE — NURSING NOTE
Teaching Flowsheet   Relevant Diagnosis: SacroIliitis  Teaching Topic: preop Fusion Left      Person(s) involved in teaching:   Patient     Motivation Level:  Asks Questions: Yes  Eager to Learn: Yes  Cooperative: Yes  Receptive (willing/able to accept information): Yes  Any cultural factors/Bahai beliefs that may influence understanding or compliance? No       Patient demonstrates understanding of the following:  Reason for the appointment, diagnosis and treatment plan: Yes  Knowledge of proper use of medications and conditions for which they are ordered (with special attention to potential side effects or drug interactions): Yes  Which situations necessitate calling provider and whom to contact: Yes       Teaching Concerns Addressed:        Proper use and care of meds. (medical equip, care aids, etc.): Yes  Nutritional needs and diet plan: Yes  Pain management techniques: Yes  Wound Care: Yes  How and/when to access community resources: Yes     Instructional Materials Used/Given: preop pkt     Time spent with patient: 15 minutes.

## 2018-07-25 NOTE — LETTER
2018       RE: Noelle Queen  [address]  [place] [address]     Dear Colleague,    Thank you for referring your patient, Noelle Queen, to the [facility] at [facility]. Please see a copy of my visit note below.    [facility]  Orthopedics  Tye López MD  2018     Name: Noelle Queen  MRN: [id number]  Age: 53 year old  : 1965  Referring provider: Referred Self    REASON FOR VISIT: Bilateral low back and SI joint pain    PRIMARY CARE PHYSICIAN: Grant Wise    HISTORY OF PRESENT ILLNESS: Noelle Queen is a 53 year old female who is seen as a follow up for bilateral low back and SI joint pain. She has a history of L4-S1 fusion in  by Dr. Hawthorne. Patient was last seen in Spine clinic on 17 for ongoing bilateral low back and SI joint pain. Since then, she reports having dorsal rami blocks on  and , S1 - S4. Was planning to have an RFA at that time, however her workers comp would not cover this. Although, patient now reports that her workers comp will be able to cover an SI joint surgery. 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 Her pain is worse on the left compared to the right. Patient also has complaints of intermittent numbness and tingling down bilateral lower extremities, exacerbated by prolonged standing.     She also endorses an increase of fainting episodes as of late, reporting that she loses consciousness secondary to pain about 3-5 times a month. Of note, she is on 6 mg oral dilaudid daily.     In regards to surgery, patient endorses being heterozygous for factor V Leiden; she takes a baby aspirin daily.       Oswestry score: 73.33  PROMIS Score: 23  Pain scale: 7    PHYSICAL EXAMINATION:No new exam performed today.    IMAGING: XR Pelvis G/E 3 Views  COMPARISON  FILMS: 6/8/16  RADIOGRAPHIC FINDINGS: Full radiological report in chart. Upsloped sacral dysmorphism. The results were reviewed with the patient    CLINICAL ASSESSMENT:   SI joint dysfunction    PLAN:   An 53 year old female with ongoing SI joint dysfunction. As her insurance is now willing to cover an SI surgical procedure, I think that plan at this time is for surgical intervention with a left SI fusion. I discussed with the patient the risks of surgery, including, but not limited to death, infection, dural tear, cauda equina syndrome, paralysis, nerve injury, and non-resolution of symptoms and the patient wishes to proceed. I would like her to be seen in the PAC clinic to assess her risks for surgery, especially given her complaints of worsening fainting episodes and with her history of being heterozygous for factor V Leiden. The patient will be scheduled at the soonest possible time. Patient verbalized their understanding and agreed with the plan.     All questions were addressed and answered.     Scribe Disclosure:   I, Sunil Valenzuela, am serving as a scribe to document services personally performed by Tye López MD at this visit, based upon the provider's statements to me. All documentation has been reviewed by the aforementioned provider prior to being entered into the official medical record.     Portions of this medical record were completed by a scribe. UPON MY REVIEW AND AUTHENTICATION BY ELECTRONIC SIGNATURE, this confirms (a) I performed the applicable clinical services, and (b) the record is accurate.      Total contact time >25 min    Respectfully,  Tye López MD    CC  Copy to patient     [address] [address]

## 2018-07-25 NOTE — MR AVS SNAPSHOT
After Visit Summary   7/25/2018    Noelle Queen    MRN: [id number]           Patient Information     Date Of Birth          1965        Visit Information        Provider Department      7/25/2018 9:45 AM Tye López MD [facility]        Today's Diagnoses     Sacroiliitis (H)    -  1       Follow-ups after your visit        Additional Services     PAC Visit Referral (For [facility] Only)       Does this visit require an Anesthesia consult?  Yes - Evaluate for medical necessity related to one of the following conditions:  Has Parkinsons & Factor V Leiden.  On Baby ASA.  Family HX. Of PE & Clot. Need Hematology records from [facility].    H&P done by:  PAC      Please be aware that coverage of these services is subject to the terms and limitations of your health insurance plan.  Call member services at your health plan with any benefit or coverage questions.      Please bring the following to your appointment:  >>   Any x-rays, CTs or MRIs which have been performed.  Contact the facility where they were done to arrange for  prior to your scheduled appointment.  Any new CT, MRI or other procedures ordered by your specialist must be performed at a [place] facility or coordinated by your clinic's referral office.    >>   List of current medications  >>   This referral request   >>   Any documents/labs given to you for this referral            PHYSICAL THERAPY REFERRAL (External-Prints)       Physical Therapy Referral                  Who to contact     Please call your clinic at [phone] to:    Ask questions about your health    Make or cancel appointments    Discuss your medicines    Learn about your test results    Speak to your doctor            Additional Information About Your Visit        MyChart Information     [employer] gives you secure access to your electronic health record. If you see a primary care provider, you can also send messages to your care team  "and make appointments. If you have questions, please call your primary care clinic.  If you do not have a primary care provider, please call [phone] and they will assist you.      [employer] is an electronic gateway that provides easy, online access to your medical records. With [employer], you can request a clinic appointment, read your test results, renew a prescription or communicate with your care team.     To access your existing account, please contact your [facility] or call [phone] for assistance.        Care EveryWhere ID     This is your Care EveryWhere ID. This could be used by other organizations to access your [facility] medical records  [id number]        Your Vitals Were     Height BMI (Body Mass Index)                1.854 m (6' 1\") 28.37 kg/m2           Blood Pressure from Last 3 Encounters:   06/26/17 (!) 147/96   09/16/14 105/66   03/25/14 112/80    Weight from Last 3 Encounters:   07/25/18 97.5 kg (215 lb)   06/26/17 97.5 kg (215 lb)   05/24/17 99.2 kg (218 lb 12.8 oz)              We Performed the Following     PAC Visit Referral (For [facility] Only)     Shirley-Operative Worksheet     PHYSICAL THERAPY REFERRAL (External-Prints)        Primary Care Provider Office Phone # Fax #    Isaias Mcmahon [phone]       [facility] [address] [place] [address]        Equal Access to Services     LEWIS CHASE AH: Hadii bety wolffo Sodusty, waaxda luqadaha, qaybta kaalmada adeegyada, alysia ballard. So [facility] [phone].    ATENCIÓN: Si habla español, tiene a perez disposición servicios gratuitos de asistencia lingüística. Llame al [phone].    We comply with applicable federal civil rights laws and [place] laws. We do not discriminate on the basis of race, color, national origin, age, disability, sex, sexual orientation, or gender identity.            Thank you!     Thank you for choosing " [facility]  for your care. Our goal is always to provide you with excellent care. Hearing back from our patients is one way we can continue to improve our services. Please take a few minutes to complete the written survey that you may receive in the mail after your visit with us. Thank you!             Your Updated Medication List - Protect others around you: Learn how to safely use, store and throw away your medicines at www.disposemymeds.org.          This list is accurate as of 7/25/18  1:31 PM.  Always use your most recent med list.                   Brand Name Dispense Instructions for use Diagnosis    aspirin 81 MG tablet      Take 81 mg by mouth daily        blood glucose monitoring lancets      1 each by In Vitro route Use to test blood sugar 3 times daily or as directed.        CLONAZEPAM PO      Take 1 mg by mouth At Bedtime        diazepam 5 MG tablet    VALIUM     Take 5 mg by mouth as needed.        DILAUDID 2 MG tablet   Generic drug:  HYDROmorphone      Take 2 mg by mouth as needed.        fluticasone 50 MCG/ACT spray    FLONASE     Spray 2 sprays into both nostrils daily    Sacroiliac joint pain       MIDODRINE HCL PO           MIRALAX PO      Take 1 tablet by mouth daily as needed    Sacroiliac joint pain       nortriptyline 25 MG capsule    PAMELOR          * PARCOPA  MG per ODT tab   Generic drug:  carbidopa-levodopa      Take 1 tablet by mouth 3 times daily        * SINEMET CR  MG per CR tablet   Generic drug:  carbidopa-levodopa           propranolol 10 MG tablet    INDERAL     Take 10 mg by mouth as needed.        senna-docusate 8.6-50 MG per tablet    SENOKOT-S;PERICOLACE     Take 1 tablet by mouth 4 times daily    Sacroiliac joint pain       STIOLTO RESPIMAT IN           ZENPEP 93654 units Cpep   Generic drug:  amylase-lipase-protease           ZOFRAN ODT 4 MG ODT tab   Generic drug:  ondansetron      Take 1 tablet by mouth every 8 hours. 30 minutes before meals.         * Notice:  This list has 2 medication(s) that are the same as other medications prescribed for you. Read the directions carefully, and ask your doctor or other care provider to review them with you.

## 2018-09-06 ENCOUNTER — THERAPY VISIT (OUTPATIENT)
Dept: PHYSICAL THERAPY | Facility: CLINIC | Age: 53
End: 2018-09-06
Payer: OTHER MISCELLANEOUS

## 2018-09-06 DIAGNOSIS — M53.3 SACROILIAC JOINT PAIN: Primary | ICD-10-CM

## 2018-09-06 PROCEDURE — 97530 THERAPEUTIC ACTIVITIES: CPT | Mod: GP | Performed by: PHYSICAL THERAPIST

## 2018-09-06 PROCEDURE — 97116 GAIT TRAINING THERAPY: CPT | Mod: GP | Performed by: PHYSICAL THERAPIST

## 2018-09-06 PROCEDURE — 97163 PT EVAL HIGH COMPLEX 45 MIN: CPT | Mod: GP | Performed by: PHYSICAL THERAPIST

## 2018-09-06 PROCEDURE — 97110 THERAPEUTIC EXERCISES: CPT | Mod: GP | Performed by: PHYSICAL THERAPIST

## 2018-09-06 NOTE — PROGRESS NOTES
[facility] Initial Evaluation  Physical Therapy Initial Examination/Evaluation    September 6, 2018    Noelle Queen  is a 53 year old  female referred to physical therapy by Dr. López for treatment of B SIJ fusion.        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  I stood up from the very low chair and my L5-S1 went out.  2007 I had a kylee-laminectomy.  This started to collape on itself and my legs were completely numb.  Then 10/5/2012, I had a L4-S1 fusion in 2012 by Dr. Hawthorne.  This did not resolve the numbness into the legs.  It stabilized my back but not the legs.   I was sent to Dr. López on 5/24/17 for ongoing bilateral low back and SI joint pain. Additionally, I have had dorsal rami blocks on January 30th and February 15th, 2018; S1 - S4.     I have had an SIJ injection in the past and I had 100% relief for a short period of time.  Additional concerns include an increase of fainting episodes, due to lack of Rx.  Improved in the last month due to increase in dilaudid.         DOS 2012 fusion  Prior treatment no formal physical therapy following fusion. Effect of prior treatment ineffective    Chief Complaint:   Intense pain though the midline and through the SIJ.  Bending to lift mark litter, cutting up boxes.  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  Pain is worse on the left compared to the right SIJ.       I am also wearing bilateral knee braces and I saw a PT 12/15/2015 and she pulled on my legs and the next morning I woke up with foot drop.  I then saw a PMR doctor and performed an examination and she lifted my legs and it really hurt.  I had a fracture of the medial femoral condyle and medical meniscal tears bilaterally.  So this is also now part of the work comp claim due to the damage by the doctor and physical  therapist.  I was given braces and they were hoping it would heal.  A year later the damage has not improved and they say I will need TKA after the SIJ fusion.      Pain location: central midline, B SIJ pain  Quality: sharp, shooting, aching  Constant/Intermittent: constant  Time of day: non-dependent  Symptoms have worsened since onset.    Current pain 8/10.  Pain at best 3/10.  Pain at worst 10/10 with fainting with loss of consiciousness.    Symptoms aggravated by bending, sitting, stairs, standing (any vertical loading on the spine).    Symptoms improved with ice, supine positioning- deep recline     Social history:  Pt lives independently in a 2 story house; .  Pt has help from neighbors, [employer] for vaccuming, grocery delivery.    Occupation: disability.  Job duties:  Home and self cares.    Patient having difficulty with ADLs: all home activity.    Patient's goals are improve pain.    Patient reports general health as fair.  Related medical history cancer, chemical dependency, concussion, depression, diabetes, menopause, numbness/tingling, osteoarthritis, seizures.    Surgical History:  L arm melanoma, spine x4, gastroc x 3, L wrist, brain.    Imaging: x-ray, MRI.    Medications:  Pain, muscle relaxants, hypotension, sinemet for parkinsomism ans well as PRN diazepium and clonazepum for Parkinson's.       Outcome measure:   Oswestry  Return to MD:  As needed.      Clinical Impression: Noelle present to [facility] for pre-operative SIJ fusion education and preparation.  Pt attended visit with surgical procedure, restrictions and considerations discussed in detail.  Post operative home program was given and reviewed.  All questions were answered and patient verbalized understanding.      Recommendation due to subjective report of UE weakness/tremors; 3-4 weeks transitional care post op with use of WW for management; WC if incr in UE demand limits daily function.  See plan of  care outlined below.        HPI                    Objective:  Observation:   -presents to clinic today with use of SEC and bilateral knee braces.      Posture:   - forward trunk lean, slight knee flexion with braces present.  Narrow base of support.      Ambulation:   - demonstrates without SEC in clinic; use of wall for support, decr stride length bilaterally.         System         Lumbar/SI Evaluation  ROM:      Strength: S1 as ankle eversion 5/5 B; UE all 5/5 except biceps 4+/5 B.    Lumbar Myotomes:    T12-L3 (Hip Flex):  Left: 4+    Right: 4+  L2-4 (Quads):  Left:  4    Right:  5  L4 (Ankle DF):  Left:  4    Right:  5  L5 (Great Toe Ext): Left: 5-    Right: 5                                                                  General     ROS    Assessment/Plan:    Patient is a 53 year old female with sacral complaints.    Patient has the following significant findings with corresponding treatment plan.                Diagnosis 1:  B SIJ pain     Pain -  hot/cold therapy, US, manual therapy, self management, education and home program  Decreased ROM/flexibility - manual therapy, therapeutic exercise and home program  Decreased strength - therapeutic exercise and therapeutic activities  Impaired balance - neuro re-education and therapeutic activities  Inflammation - cold therapy, US and self management/home program  Impaired muscle performance - neuro re-education and home program  Decreased function - therapeutic activities  Impaired posture - neuro re-education    Therapy Evaluation Codes:   1) History comprised of:   Personal factors that impact the plan of care:      Living environment, Overall behavior pattern, Past/current experiences, Time since onset of symptoms and Work status.    Comorbidity factors that impact the plan of care are:       cancer, chemical dependency, concussion, depression, diabetes, menopause, numbness/tingling, osteoarthritis, seizures..     Medications impacting care: Pain and  Parkinsons' medication .  2) Examination of Body Systems comprised of:   Body structures and functions that impact the plan of care:      Hip, Knee, Lumbar spine, Pelvis and Sacral illiac joint.   Activity limitations that impact the plan of care are:      Bathing, Bending, Dressing, Lifting, Sitting, Sports, Squatting/kneeling, Stairs, Standing and Walking.  3) Clinical presentation characteristics are:   Unstable/Unpredictable.  4) Decision-Making    High complexity using standardized patient assessment instrument and/or measureable assessment of functional outcome.  Cumulative Therapy Evaluation is: High complexity.    Previous and current functional limitations:  (See Goal Flow Sheet for this information)    Short term and Long term goals: (See Goal Flow Sheet for this information)     Communication ability:  Patient appears to be able to clearly communicate and understand verbal and written communication and follow directions correctly.  Treatment Explanation - The following has been discussed with the patient:   RX ordered/plan of care  Anticipated outcomes  Possible risks and side effects  This patient would benefit from PT intervention to resume normal activities.   Rehab potential is fair.    Frequency:  2 X a month, once daily  Duration:  for 2 months to understand post operative measures and reinforce pre-operative strengthening.    Discharge Plan:  Achieve all LTG.  Independent in home treatment program.  Reach maximal therapeutic benefit.    Please refer to the daily flowsheet for treatment today, total treatment time and time spent performing 1:1 timed codes.

## 2018-09-06 NOTE — MR AVS SNAPSHOT
After Visit Summary   9/6/2018    Noelle Queen    MRN: [id number]           Patient Information     Date Of Birth          1965        Visit Information        Provider Department      9/6/2018 8:50 AM Marissa Elena PT [facility] Physical Therapy        Today's Diagnoses     Sacroiliac joint pain    -  1       Follow-ups after your visit        Who to contact     If you have questions or need follow up information about today's clinic visit or your schedule please contact [facility] directly at [phone].  Normal or non-critical lab and imaging results will be communicated to you by MyChart, letter or phone within 4 business days after the clinic has received the results. If you do not hear from us within 7 days, please contact the clinic through [employer] or phone. If you have a critical or abnormal lab result, we will notify you by phone as soon as possible.  Submit refill requests through [employer] or call your pharmacy and they will forward the refill request to us. Please allow 3 business days for your refill to be completed.          Additional Information About Your Visit        MyChart Information     [employer] gives you secure access to your electronic health record. If you see a primary care provider, you can also send messages to your care team and make appointments. If you have questions, please call your primary care clinic.  If you do not have a primary care provider, please call [phone] and they will assist you.        Care EveryWhere ID     This is your Care EveryWhere ID. This could be used by other organizations to access your [facility] medical records  [id number]         Blood Pressure from Last 3 Encounters:   06/26/17 (!) 147/96   09/16/14 105/66   03/25/14 112/80    Weight from Last 3 Encounters:   07/25/18 97.5 kg (215 lb)   06/26/17 97.5 kg (215 lb)   05/24/17 99.2 kg  (218 lb 12.8 oz)              We Performed the Following     Gait Training Therapy     ABDOUL Inital Eval Report     PT Eval, High Complexity (28384)     Therapeutic Activities     Therapeutic Exercises        Primary Care Provider Office Phone # Fax #    Isaias Mcmahon [phone]       [facility] [address] [place] [address]        Equal Access to Services     : Hadii aad ku hadasho Soomaali, waaxda luqadaha, qaybta kaalmada adeegyada, waxay idiin hayaan adeeg kharash la'aan ah. So [facility] [phone].    ATENCIÓN: Si habla espradha, tiene a perez disposición servicios gratuitos de asistencia lingüística. Zabrina al [phone].    We comply with applicable federal civil rights laws and [place] laws. We do not discriminate on the basis of race, color, national origin, age, disability, sex, sexual orientation, or gender identity.            Thank you!     Thank you for [facility] PHYSICAL THERAPY  for your care. Our goal is always to provide you with excellent care. Hearing back from our patients is one way we can continue to improve our services. Please take a few minutes to complete the written survey that you may receive in the mail after your visit with us. Thank you!             Your Updated Medication List - Protect others around you: Learn how to safely use, store and throw away your medicines at www.disposemymeds.org.          This list is accurate as of 9/6/18 10:50 AM.  Always use your most recent med list.                   Brand Name Dispense Instructions for use Diagnosis    aspirin 81 MG tablet      Take 81 mg by mouth daily        blood glucose monitoring lancets      1 each by In Vitro route Use to test blood sugar 3 times daily or as directed.        CLONAZEPAM PO      Take 1 mg by mouth At Bedtime        diazepam 5 MG tablet    VALIUM     Take 5 mg by mouth as needed.        DILAUDID 2 MG tablet    Generic drug:  HYDROmorphone      Take 2 mg by mouth as needed.        fluticasone 50 MCG/ACT spray    FLONASE     Spray 2 sprays into both nostrils daily    Sacroiliac joint pain       MIDODRINE HCL PO           MIRALAX PO      Take 1 tablet by mouth daily as needed    Sacroiliac joint pain       nortriptyline 25 MG capsule    PAMELOR          * PARCOPA  MG per ODT tab   Generic drug:  carbidopa-levodopa      Take 1 tablet by mouth 3 times daily        * SINEMET CR  MG per CR tablet   Generic drug:  carbidopa-levodopa           propranolol 10 MG tablet    INDERAL     Take 10 mg by mouth as needed.        senna-docusate 8.6-50 MG per tablet    SENOKOT-S;PERICOLACE     Take 1 tablet by mouth 4 times daily    Sacroiliac joint pain       STIOLTO RESPIMAT IN           ZENPEP 68875 units Cpep   Generic drug:  amylase-lipase-protease           ZOFRAN ODT 4 MG ODT tab   Generic drug:  ondansetron      Take 1 tablet by mouth every 8 hours. 30 minutes before meals.        * Notice:  This list has 2 medication(s) that are the same as other medications prescribed for you. Read the directions carefully, and ask your doctor or other care provider to review them with you.

## 2018-09-10 ENCOUNTER — HOSPITAL ENCOUNTER (OUTPATIENT)
Facility: CLINIC | Age: 53
End: 2018-09-10
Attending: ORTHOPAEDIC SURGERY | Admitting: ORTHOPAEDIC SURGERY
Payer: MEDICARE

## 2018-10-01 ENCOUNTER — TELEPHONE (OUTPATIENT)
Dept: ORTHOPEDICS | Facility: CLINIC | Age: 53
End: 2018-10-01

## 2019-01-01 ENCOUNTER — HOSPITAL ENCOUNTER (OUTPATIENT)
Dept: GENERAL RADIOLOGY | Facility: CLINIC | Age: 54
End: 2019-06-10
Attending: ORTHOPAEDIC SURGERY
Payer: OTHER MISCELLANEOUS

## 2019-01-01 ENCOUNTER — HOSPITAL ENCOUNTER (OUTPATIENT)
Dept: CT IMAGING | Facility: CLINIC | Age: 54
End: 2019-06-10
Attending: ORTHOPAEDIC SURGERY
Payer: OTHER MISCELLANEOUS

## 2019-01-01 ENCOUNTER — OFFICE VISIT (OUTPATIENT)
Dept: ORTHOPEDICS | Facility: CLINIC | Age: 54
End: 2019-01-01
Payer: OTHER MISCELLANEOUS

## 2019-01-01 ENCOUNTER — HOSPITAL ENCOUNTER (OUTPATIENT)
Facility: CLINIC | Age: 54
Discharge: HOME | End: 2019-06-10
Admitting: ORTHOPAEDIC SURGERY
Payer: OTHER MISCELLANEOUS

## 2019-01-01 ENCOUNTER — TELEPHONE (OUTPATIENT)
Dept: ORTHOPEDICS | Facility: CLINIC | Age: 54
End: 2019-01-01

## 2019-01-01 ENCOUNTER — ANCILLARY PROCEDURE (OUTPATIENT)
Dept: GENERAL RADIOLOGY | Facility: CLINIC | Age: 54
End: 2019-01-01
Attending: ORTHOPAEDIC SURGERY
Payer: OTHER MISCELLANEOUS

## 2019-01-01 ENCOUNTER — HEALTH MAINTENANCE LETTER (OUTPATIENT)
Age: 54
End: 2019-01-01

## 2019-01-01 ENCOUNTER — HOSPITAL ENCOUNTER (OUTPATIENT)
Facility: CLINIC | Age: 54
End: 2019-01-01
Payer: OTHER MISCELLANEOUS

## 2019-01-01 ENCOUNTER — OFFICE VISIT (OUTPATIENT)
Dept: ORTHOPEDICS | Facility: CLINIC | Age: 54
End: 2019-01-01
Payer: COMMERCIAL

## 2019-01-01 ENCOUNTER — TELEPHONE (OUTPATIENT)
Dept: MEDSURG UNIT | Facility: CLINIC | Age: 54
End: 2019-01-01

## 2019-01-01 ENCOUNTER — DOCUMENTATION ONLY (OUTPATIENT)
Dept: CARE COORDINATION | Facility: CLINIC | Age: 54
End: 2019-01-01

## 2019-01-01 ENCOUNTER — MYC MEDICAL ADVICE (OUTPATIENT)
Dept: ORTHOPEDICS | Facility: CLINIC | Age: 54
End: 2019-01-01

## 2019-01-01 ENCOUNTER — TRANSFERRED RECORDS (OUTPATIENT)
Dept: HEALTH INFORMATION MANAGEMENT | Facility: CLINIC | Age: 54
End: 2019-01-01

## 2019-01-01 VITALS — BODY MASS INDEX: 28.71 KG/M2 | HEIGHT: 72 IN | WEIGHT: 212 LBS

## 2019-01-01 VITALS
TEMPERATURE: 98.8 F | RESPIRATION RATE: 16 BRPM | SYSTOLIC BLOOD PRESSURE: 123 MMHG | OXYGEN SATURATION: 96 % | DIASTOLIC BLOOD PRESSURE: 60 MMHG | HEART RATE: 66 BPM

## 2019-01-01 VITALS — BODY MASS INDEX: 28.44 KG/M2 | WEIGHT: 210 LBS | HEIGHT: 72 IN

## 2019-01-01 DIAGNOSIS — M46.1 SACROILIITIS (H): ICD-10-CM

## 2019-01-01 DIAGNOSIS — G89.29 CHRONIC BILATERAL LOW BACK PAIN, WITH SCIATICA PRESENCE UNSPECIFIED: Primary | ICD-10-CM

## 2019-01-01 DIAGNOSIS — F40.240 CLAUSTROPHOBIA: Primary | ICD-10-CM

## 2019-01-01 DIAGNOSIS — M48.061 SPINAL STENOSIS OF LUMBAR REGION, UNSPECIFIED WHETHER NEUROGENIC CLAUDICATION PRESENT: ICD-10-CM

## 2019-01-01 DIAGNOSIS — M54.16 LUMBAR RADICULOPATHY: ICD-10-CM

## 2019-01-01 DIAGNOSIS — M54.16 LUMBAR RADICULOPATHY: Primary | ICD-10-CM

## 2019-01-01 DIAGNOSIS — M53.3 SACRALGIA: ICD-10-CM

## 2019-01-01 DIAGNOSIS — F40.240 CLAUSTROPHOBIA: ICD-10-CM

## 2019-01-01 DIAGNOSIS — M54.5 CHRONIC BILATERAL LOW BACK PAIN, WITH SCIATICA PRESENCE UNSPECIFIED: Primary | ICD-10-CM

## 2019-01-01 PROCEDURE — 62304 MYELOGRAPHY LUMBAR INJECTION: CPT

## 2019-01-01 PROCEDURE — 25500064 ZZH RX 255 OP 636: Performed by: ORTHOPAEDIC SURGERY

## 2019-01-01 PROCEDURE — 72132 CT LUMBAR SPINE W/DYE: CPT

## 2019-01-01 PROCEDURE — 25000125 ZZHC RX 250: Performed by: ORTHOPAEDIC SURGERY

## 2019-01-01 PROCEDURE — 40000863 ZZH STATISTIC RADIOLOGY XRAY, US, CT, MAR, NM

## 2019-01-01 RX ORDER — DEXTROSE MONOHYDRATE 25 G/50ML
25-50 INJECTION, SOLUTION INTRAVENOUS
Status: DISCONTINUED | OUTPATIENT
Start: 2019-01-01 | End: 2019-01-01 | Stop reason: HOSPADM

## 2019-01-01 RX ORDER — DIAZEPAM 5 MG
TABLET ORAL
Qty: 2 TABLET | Refills: 0
Start: 2019-01-01

## 2019-01-01 RX ORDER — SODIUM CHLORIDE 9 MG/ML
100 INJECTION, SOLUTION INTRAVENOUS CONTINUOUS
Status: CANCELLED | OUTPATIENT
Start: 2019-01-01 | End: 2019-01-01

## 2019-01-01 RX ORDER — NICOTINE POLACRILEX 4 MG
15-30 LOZENGE BUCCAL
Status: DISCONTINUED | OUTPATIENT
Start: 2019-01-01 | End: 2019-01-01 | Stop reason: HOSPADM

## 2019-01-01 RX ORDER — NICOTINE POLACRILEX 4 MG
15-30 LOZENGE BUCCAL
Status: CANCELLED | OUTPATIENT
Start: 2019-01-01

## 2019-01-01 RX ORDER — SODIUM CHLORIDE 9 MG/ML
INJECTION, SOLUTION INTRAVENOUS CONTINUOUS
Status: CANCELLED | OUTPATIENT
Start: 2019-01-01

## 2019-01-01 RX ORDER — LIDOCAINE 40 MG/G
CREAM TOPICAL
Status: CANCELLED | OUTPATIENT
Start: 2019-01-01

## 2019-01-01 RX ORDER — IOPAMIDOL 408 MG/ML
20 INJECTION, SOLUTION INTRATHECAL ONCE
Status: COMPLETED | OUTPATIENT
Start: 2019-01-01 | End: 2019-01-01

## 2019-01-01 RX ORDER — LUBIPROSTONE 8 UG/1
CAPSULE ORAL
COMMUNITY

## 2019-01-01 RX ORDER — DIAZEPAM 5 MG
TABLET ORAL
Qty: 2 TABLET | OUTPATIENT
Start: 2019-01-01

## 2019-01-01 RX ORDER — DEXTROSE MONOHYDRATE 25 G/50ML
25-50 INJECTION, SOLUTION INTRAVENOUS
Status: CANCELLED | OUTPATIENT
Start: 2019-01-01

## 2019-01-01 RX ORDER — SODIUM CHLORIDE 9 MG/ML
100 INJECTION, SOLUTION INTRAVENOUS CONTINUOUS
Status: DISCONTINUED | OUTPATIENT
Start: 2019-01-01 | End: 2019-01-01 | Stop reason: HOSPADM

## 2019-01-01 RX ADMIN — IOPAMIDOL 14 ML: 408 INJECTION, SOLUTION INTRATHECAL at 15:01

## 2019-01-01 RX ADMIN — LIDOCAINE HYDROCHLORIDE 5 ML: 10 INJECTION, SOLUTION EPIDURAL; INFILTRATION; INTRACAUDAL; PERINEURAL at 15:00

## 2019-01-01 ASSESSMENT — ENCOUNTER SYMPTOMS
SHORTNESS OF BREATH: 1
NECK MASS: 0
DIARRHEA: 1
NUMBNESS: 1
BLOATING: 1
DEPRESSION: 1
FLANK PAIN: 0
TINGLING: 1
HOARSE VOICE: 1
COUGH DISTURBING SLEEP: 0
CONSTIPATION: 1
JOINT SWELLING: 1
BLOOD IN STOOL: 0
JOINT SWELLING: 1
HEMOPTYSIS: 0
ABDOMINAL PAIN: 1
SPEECH CHANGE: 1
DIFFICULTY URINATING: 0
SPEECH CHANGE: 0
PANIC: 0
STIFFNESS: 1
PARALYSIS: 0
NERVOUS/ANXIOUS: 0
BLOOD IN STOOL: 0
TREMORS: 1
HEARTBURN: 0
MUSCLE WEAKNESS: 1
INSOMNIA: 1
SMELL DISTURBANCE: 0
VOMITING: 1
VOMITING: 1
FLANK PAIN: 0
COUGH: 0
BOWEL INCONTINENCE: 0
HEMATURIA: 0
TROUBLE SWALLOWING: 1
INSOMNIA: 1
RECTAL PAIN: 0
BACK PAIN: 1
COUGH: 0
SEIZURES: 0
MUSCLE CRAMPS: 1
MYALGIAS: 1
DECREASED CONCENTRATION: 0
SNORES LOUDLY: 0
DYSPNEA ON EXERTION: 1
SPUTUM PRODUCTION: 0
PANIC: 0
HEMATURIA: 0
BOWEL INCONTINENCE: 0
DEPRESSION: 1
NECK PAIN: 1
DYSPNEA ON EXERTION: 0
MYALGIAS: 1
DIARRHEA: 0
SORE THROAT: 0
HEADACHES: 0
RECTAL PAIN: 0
SEIZURES: 0
SPUTUM PRODUCTION: 0
NERVOUS/ANXIOUS: 1
WEAKNESS: 1
DIZZINESS: 1
WEAKNESS: 1
MUSCLE CRAMPS: 1
DIFFICULTY URINATING: 0
LOSS OF CONSCIOUSNESS: 1
NECK PAIN: 1
HEADACHES: 0
MUSCLE WEAKNESS: 1
DIZZINESS: 1
POSTURAL DYSPNEA: 0
SNORES LOUDLY: 0
DYSURIA: 0
WHEEZING: 1
JAUNDICE: 0
HEARTBURN: 0
LOSS OF CONSCIOUSNESS: 1
JAUNDICE: 0
POSTURAL DYSPNEA: 0
TREMORS: 1
BACK PAIN: 1
WHEEZING: 1
MEMORY LOSS: 0
TINGLING: 1
BLOATING: 1
DISTURBANCES IN COORDINATION: 1
SINUS CONGESTION: 1
NAUSEA: 1
STIFFNESS: 1
ARTHRALGIAS: 1
NAUSEA: 1
COUGH DISTURBING SLEEP: 0
DECREASED CONCENTRATION: 1
CONSTIPATION: 1
SINUS PAIN: 1
ARTHRALGIAS: 1
NUMBNESS: 1
HEMOPTYSIS: 0
DISTURBANCES IN COORDINATION: 1
DYSURIA: 0
SHORTNESS OF BREATH: 1
MEMORY LOSS: 0
TASTE DISTURBANCE: 0
PARALYSIS: 0
ABDOMINAL PAIN: 0

## 2019-01-01 ASSESSMENT — MIFFLIN-ST. JEOR
SCORE: 1690.43
SCORE: 1704.51

## 2019-01-15 ENCOUNTER — TELEPHONE (OUTPATIENT)
Dept: ORTHOPEDICS | Facility: CLINIC | Age: 54
End: 2019-01-15

## 2019-01-15 NOTE — TELEPHONE ENCOUNTER
Received call from patient requesting to discuss scheduling surgery again with Dr. López. Patient stated she is currently recovering from a fall, and was told that Dr. López is also out on medical leave. Patient stated she is not in a hurry to schedule, and will call back in a month.

## 2019-04-18 NOTE — LETTER
RE: Noelle Queen  [address] [facility] [address]     Dear Colleague,    Thank you for referring your patient, Noelle Queen, to the [facility] at [facility]. Please see a copy of my visit note below.    [facility]  Orthopedics  Tye López MD  2019     Name: Noelle Queen  MRN: [id number]  Age: 53 year old  : 1965  Referring provider: Referred Self     Chief Complaint: Sacroiliitis follow-up and surgery discussion    History of Present Illness:    Noelle Queen is a 53 year old, female and WC patient who presents today for follow-up regarding her bilateral lower back (L>R) and SI joint pain. She has a history of L4-S1 fusion in  by Dr. Hawthorne. The patient was last evaluated on 18, at which time she reported having dorsi rami blocks on 18 and 2/15/18, S1-S4. The patient reported that her WC now was able to cover SI joint surgery. 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 She also complained of intermittent numbness and tingling down bilateral lower extremities, exacerbated by prolonged standing. She opted for a left SI joint fusion but due to her hemoglobin A1c being too high and then my shoulder injury we had to reschedule the date, and she presents today to discuss the surgery. Today, the patient reports having a bad accident on 19 and when she fell off of her bed onto to right side and was on the floor for 8-9 hours. She injured her right shoulder during this incident. She attends PT for this injury, and she notes another time she fell on her back when walking to her PT appointment. She reported that her sacroiliac pain increased dramatically after this. She has additional numbness and tingling and new bladder incontinence. She is currently taking 2mg Dilauded 3x per day which is 24 mg morphine equivalents per  day.     ALINE: 82.22  Lhwybl22: 17  Pain scale: 8    Physical Examination:  5/5 ankle dorsal flexion, plantar flexion, left EHL, right EHL  Right great toe is fused   4/5 right quadriceps and left quadriceps  Tremulousness with knee extension on left side    Positive right side reverse straight leg raise test  2+ left, 1+ on right for knee jerk reflex  Absent bilateral achilles reflex    Imaging:  CT lumbar spine w/o contrast (4/3/19)  1. Overall, no definite interval change from prior. Stable interosseous bridging along dorsal osseous fusion masses otherwise no interval change in anterior and posterior lumbar fusion from the L4-S1 levels.   2. Mild posterior disc bulge at L3-4 level contacts the traversing L4 nerve roots within the subarticular recesses bilaterally unchanged.   3. Mild posterior osteophytic ridge at L5-S1 level contacts the right S1 nerve root within the subarticular recess unchanged.   4. Additional degenerative changes as above. No evidence for subluxation or compression fracture.. Bones appear osteopenic.    Also reviewed plain films from January no evident adjacent segment degeneration.    On the CT there appears to be adjacent segment stenosis.    Assessment:   53 year old, female with:  1. Sacroiliitis   2. History of L4-S1 anterior posterior spinal fusion  3. Right L3/L4 radiculopathy  4. History of Parkinson's disease  5. Diabetes    Plan:   We need to obtain a myelogram CAT scan for further investigation into her new right L3/L4 radiculopathy before her surgery, and this scan is necessary. 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 The patient's neurologist told her not to get a myelogram because it increases CSF pressure and exacerbates her Parkinson's symptoms.  We will collaborate with her neurologist to see if we can remove 10 cc of CSF prior to the injection of the myelogram contrast. Once she has seen her  neurologist (to assess the complicating conditions of the Parkinson's the AVM clipping and the DM) in terms of her new radicuopathy along with anatomic imaging, then we can discuss the surgical strategy moving forward.     Scribe Disclosure:  I, López Diez, am serving as a scribe to document services personally performed by Tye López MD at this visit, based upon the provider's statements to me. All documentation has been reviewed by the aforementioned provider prior to being entered into the official medical record.     Total contact time >40 minutes.    Again, thank you for allowing me to participate in the care of your patient.      Sincerely,    Tye López MD

## 2019-04-18 NOTE — NURSING NOTE
"Reason For Visit:   Chief Complaint   Patient presents with     Pelvis - RECHECK     Schedule Surgery     rediscuss surgery Sacroilitis       Primary MD: Grant Wise  Ref. MD: Dr. Hawthorne  Neurologist: Dr. 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  Work status?  On disability.  Date of injury: 9/13/06  Type of injury: WC., stood up from a chair  Date of surgery: 2007, lumbar hemilaminectomy                           10/5/12, lumbar fusion with Dr. Hawthorne     Smoker: No        Ht 1.854 m (6' 1\")   Wt 96.2 kg (212 lb)   BMI 27.97 kg/m      Pain Assessment  Patient Currently in Pain: Yes  0-10 Pain Scale: 8  Primary Pain Location: (SI jt )  Other Pain Locations: bilateral knees  Pain Descriptors: Sharp, Burning, Shooting, Constant, Tingling, Numbness  Alleviating Factors: Pain medication, Ice, Rest  Aggravating Factors: Sitting, Standing, Stairs, Walking, Movement(slopes/ramps)    Oswestry (ALINE) Questionnaire    OSWESTRY DISABILITY INDEX 4/18/2019   Count 9   Sum 37   Oswestry Score (%) 82.22   Some recent data might be hidden            Neck Disability Index (NDI) Questionnaire    No flowsheet data found.           Visual Analog Pain Scale  Back Pain Scale 0-10: 8  Right leg pain: 8(knee)  Left leg pain: 8(knee)    Promis 10 Assessment    PROMIS 10 4/18/2019   In general, would you say your health is: Fair   In general, would you say your quality of life is: Fair   In general, how would you rate your physical health? Fair   In general, how would you rate your mental health, including your mood and your ability to think? Very good   In general, how would you rate your satisfaction with your social activities and relationships? Poor   In general, please rate how well you carry out your usual social activities and roles Poor   To what extent are you able to carry out your everyday physical activities such as walking, climbing stairs, carrying groceries, or moving a chair? A little   How " often have you been bothered by emotional problems such as feeling anxious, depressed or irritable? Sometimes   How would you rate your fatigue on average? Severe   How would you rate your pain on average?   0 = No Pain  to  10 = Worst Imaginable Pain 10   Global Physical Health Score : Raw Score -   Global Mental Health Score : Raw Score -   Total (Physical + Mental Health Score) -   In general, would you say your health is: 2   In general, would you say your quality of life is: 2   In general, how would you rate your physical health? 2   In general, how would you rate your mental health, including your mood and your ability to think? 4   In general, how would you rate your satisfaction with your social activities and relationships? 1   In general, please rate how well you carry out your usual social activities and roles. (This includes activities at home, at work and in your community, and responsibilities as a parent, child, spouse, employee, friend, etc.) 1   To what extent are you able to carry out your everyday physical activities such as walking, climbing stairs, carrying groceries, or moving a chair? 2   In the past 7 days, how often have you been bothered by emotional problems such as feeling anxious, depressed, or irritable? 3   In the past 7 days, how would you rate your fatigue on average? 4   In the past 7 days, how would you rate your pain on average, where 0 means no pain, and 10 means worst imaginable pain? 10   Global Mental Health Score 10   Global Physical Health Score 7   PROMIS TOTAL - SUBSCORES 17   Some recent data might be hidden                Vinita Ayala, ATC

## 2019-04-18 NOTE — PROGRESS NOTES
[facility]  Orthopedics  Tye López MD  2019     Name: Noelle Queen  MRN: [id number]  Age: 53 year old  : 1965  Referring provider: Referred Self     Chief Complaint: Sacroiliitis follow-up and surgery discussion    History of Present Illness:    Noelle Queen is a 53 year old, female and  patient who presents today for follow-up regarding her bilateral lower back (L>R) and SI joint pain. She has a history of L4-S1 fusion in  by Dr. Hawthorne. The patient was last evaluated on 18, at which time she reported having dorsi rami blocks on 18 and 2/15/18, S1-S4. The patient reported that her WC now was able to cover SI joint surgery. 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 She also complained of intermittent numbness and tingling down bilateral lower extremities, exacerbated by prolonged standing. She opted for a left SI joint fusion but due to her hemoglobin A1c being too high and then my shoulder injury we had to reschedule the date, and she presents today to discuss the surgery. Today, the patient reports having a bad accident on 19 and when she fell off of her bed onto to right side and was on the floor for 8-9 hours. She injured her right shoulder during this incident. She attends PT for this injury, and she notes another time she fell on her back when walking to her PT appointment. She reported that her sacroiliac pain increased dramatically after this. She has additional numbness and tingling and new bladder incontinence. She is currently taking 2mg Dilauded 3x per day which is 24 mg morphine equivalents per day.     ALINE: 82.22  Vcyhuk29: 17  Pain scale: 8    Review of Systems:   A 10-point review of systems was obtained and is negative except for as noted in the HPI.     Physical Examination:  5/5 ankle dorsal flexion, plantar flexion, left EHL, right EHL  Right great toe is fused   4/5 right  quadriceps and left quadriceps  Tremulousness with knee extension on left side    Positive right side reverse straight leg raise test  2+ left, 1+ on right for knee jerk reflex  Absent bilateral achilles reflex    Imaging:  CT lumbar spine w/o contrast (4/3/19)  1. Overall, no definite interval change from prior. Stable interosseous bridging along dorsal osseous fusion masses otherwise no interval change in anterior and posterior lumbar fusion from the L4-S1 levels.   2. Mild posterior disc bulge at L3-4 level contacts the traversing L4 nerve roots within the subarticular recesses bilaterally unchanged.   3. Mild posterior osteophytic ridge at L5-S1 level contacts the right S1 nerve root within the subarticular recess unchanged.   4. Additional degenerative changes as above. No evidence for subluxation or compression fracture.. Bones appear osteopenic.    Also reviewed plain films from January no evident adjacent segment degeneration.    On the CT there appears to be adjacent segment stenosis.    Assessment:   53 year old, female with:  1. Sacroiliitis   2. History of L4-S1 anterior posterior spinal fusion  3. Right L3/L4 radiculopathy  4. History of Parkinson's disease  5. Diabetes    Plan:   We need to obtain a myelogram CAT scan for further investigation into her new right L3/L4 radiculopathy before her surgery, and this scan is necessary. 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 The patient's neurologist told her not to get a myelogram because it increases CSF pressure and exacerbates her Parkinson's symptoms.  We will collaborate with her neurologist to see if we can remove 10 cc of CSF prior to the injection of the myelogram contrast. Once she has seen her neurologist (to assess the complicating conditions of the Parkinson's the AVM clipping and the DM) in terms of her new radicuopathy along with anatomic imaging, then we can discuss the  surgical strategy moving forward.     Scribe Disclosure:  I, López Diez, am serving as a scribe to document services personally performed by Tye López MD at this visit, based upon the provider's statements to me. All documentation has been reviewed by the aforementioned provider prior to being entered into the official medical record.     Total contact time >40 minutes.    Tye López MD

## 2019-04-19 NOTE — TELEPHONE ENCOUNTER
See dictation of yesterday. See plan.  I called & spoke directly to her Neurologist DR.Martha Acosta at [facility] [phone].  She has received & read 's dictation from yesterday that I faxed last night.  She states  pt. MAY have CTMyelogram  & she does not have Parkinsons Disease, she has a MOvement disorder.  She does not need CSF removed before because she does not have a pressure problem,  that will not make her movement disorder worse, but if it makes pt feel better Radiologist can remove some fluid right before Myelogram at their discretion.  States pt. Had a Myelogram about 14 years ago & they are unsure if her reaction was a seizure  Or contrast reaction.  She advised pt. Take her Valium  before Myelogram.    I asked  to write a letter documenting her clearance to have Myelogram & fax to me & she agreed.  I will scan letter & inform .  I called pt. & let her know I spoke to  & relayed to pt. All above info. From .  Pt agreed & states she has Valium script from  & she will call to schedule Myelogram & RTC appt. With .  Call back prn. Pt agreed.  T.O.R.B./ROMELIA.O.COY./Tari Ulloa RN.

## 2019-04-19 NOTE — TELEPHONE ENCOUNTER
See long My chart message about which Insurance might cover spine surgery.    I called pt back & told her we will add her update to her chart after yesterdays clinic appt.    Pt. Knows she needs RTC appt. After Myelogram.  Tari Ulloa RN.

## 2019-04-30 NOTE — TELEPHONE ENCOUNTER
[facility] Call Center    Phone Message    May a detailed message be left on voicemail: no    Reason for Call: Other: Pt wants to know if the CT Myelogram she is scheduled for on order from Dr. López is going to show a herniated L3-L4. Pt reports she may have done so from carrying a can of cat food up the stairs. She wants to make sure she isn't waiting too long to get the imaging done for determination of treatment options. Pt reports increased pain due to this. Pt will see her pain provider at [facility] on 5/6 and doesn't have much for pain management. Please call Pt back to discuss.     Action Taken: Message routed to:  [facility] ([facility]): [facility]

## 2019-04-30 NOTE — TELEPHONE ENCOUNTER
Patient states she is experiencing new spine pain after carrying cat food up the stairs. Patient states that the pain is located above the 2 SI J's. Patient cannot function, it is hard for her to use the bathroom, and drive. Patient states she has cancelled all [facility] appts this week because she cannot function. Patient is wondering if Myelogram scheduled on 5/30 should be pushed up? patient would like to know what Dr. López thinks.  Please advise.     Shelli Leach CMA .    See dictation from appt., & see phone message.  I called & spoke to pt.   We reviewed these symptoms are same area of back as discussed at appt. & that yes she needs the Myelogram done since she cant have an MRI done. See dictation.  No more lifting & RICE.  Needs to make RTCappt,. With  to review results.  Appt. made.    Pt. States has RTN pain clinic appt. At [facility]. Scheduled for 5-6-19.  Call back prn. W.O./ Tari Ulloa RN.   Pt. Agreed with plan.

## 2019-05-17 NOTE — TELEPHONE ENCOUNTER
"[facility] Call Center    Phone Message    May a detailed message be left on voicemail: yes    Reason for Call: Other: Pt fell in beginning of year, which she says Dr. López knows about, in which she is seeing PT for. She said she wants to let him know this has turned into thoracic outlet syndrome. She wants to know if he wants to add any films or testing onto ones scheduled. Pt would like call back as well.     Action Taken: Message routed to:  [facility] ([facility]): Ortho.    5-20-19:: see above phone message.  I called pt back . She states last week when seeing her Physical Therapist her Therapist thinks she has reoccurrence of previous Thoracic outlet syndrome affecting her neck.  She has not seen her primary MD recently about this & also states she is \"passing out \" at home due to the back pain.  She lives alone & has Life Alert system.  I advised she call her primary MD office today & be seen & review all symptoms above & get clearance if OK  To  have the Myelogram done that is scheduled  for 5-30-19 & she agreed.   I will review with  if anything else needed before RTC appt,. 7-3-19.  Pt. Agreed.  Tari Ulloa RN.   "

## 2019-05-24 NOTE — TELEPHONE ENCOUNTER
[facility] Call Center    Phone Message    May a detailed message be left on voicemail: no    Reason for Call: Other: Pt was offered a supplementary prescription from Tari GIRALDO for Diazepam to take for her  Myelogram on 5/30/19. Pt would like the script sent to [facility] in [place]. She would like 5mg tablets. Please call Pt back to discuss.    Action Taken: Message routed to:  [facility] ([facility]): [facility].    See phone message.   I called pt back & called Valium script to pharmacy.  Pt. Knows to fill & bring with her but done take it until told by staff.  Call back prn. She agreed.  S.O. /Tari Ulloa RN.

## 2019-05-24 NOTE — TELEPHONE ENCOUNTER
Called Noelle back and let her know that we are not aware of the status of her work comp insurance, and she will need to communicate with her  or [facility].

## 2019-05-24 NOTE — TELEPHONE ENCOUNTER
[facility] Call Center    Phone Message    May a detailed message be left on voicemail: no    Reason for Call: Other: Pt wants to follow-up on whether her XR Myelogram is covered through her work comp insurance. Pt hasn't been able to reach her  to check. Please call Pt back to update Pt on approvals.     Action Taken: Message routed to:  [facility] ([facility]): [facility]

## 2019-05-29 NOTE — TELEPHONE ENCOUNTER
[facility] Call Center    Phone Message    May a detailed message be left on voicemail: yes    Reason for Call: Other: Noelle calling to verify if approval was received from [employer] to approve Myelogram. Please contact [employer] Khurram  Melonie at phone [phone] traveler fax [phone].     Action Taken: Message routed to:  [facility] ([facility]): [facility].    5-29-19::: see phone message.  I called  Financial  securing for Radiology tests [phone] & spoke to Annemarie who said myelogram was approved through [employer] not work. Comp. So they will start the work comp approval process now.  I called & notified pt. Who will F/U with financial at above ph# & she knows if not approved today then she will call Rad scheduling [phone] to [facility] Myelogram after approved & to be done before RTC appt. 7-3-19. Call back prn. Pt agreed.   Tari Ulloa RN

## 2019-06-10 NOTE — PROGRESS NOTES
"Care Suites Admission Nursing Note    Reason for admission: Lumbar myelogram  CS arrival time: 1330  Accompanied by: Friend, Nati  Name/phone of DC : Nati [phone]  Medications held: CRISTY  Consent signed: MD/PA to obtain  Abnormal assessment/labs: CRISTY  If abnormal, provider notified: CRISTY  Education/questions answered: Yes.  AVS/Discharge instructions given and reviewed and patient verbalized understanding.  Plan: Patient took diazepam 10 mg prior to procedure to help reduce \"massive tremors\" that patient had post last myelogram.  She states \"they thought I was having seizures and I was hospitalized for 3 days.\"  Rut DOYLE updated.  "

## 2019-06-10 NOTE — PROGRESS NOTES
Care Suites Post-Procedure Note    Procedure: myelogram  CS arrival time: 1545  Accompanied by: Radiology staff  Concerns/abnormal assessment after procedure: some left hand tremors  Plan: continue plan of care post procedure.    VS stable, denies any pain or discomfort.  Has some left hand tremors. Pt was up to the bathroom with her cane. Taking po fluids well.  No new sx.  Anticipate one hour bedrest post injection time.  Anticipate discharge around 1645    Care Suites Discharge Nursing Note    Education/questions answered: Yes, reinforce discharge instruction.  All questions are answered.  Patient DC location: to home via door 1 or 2  Accompanied by: her friend Nati  CS discharge time: 1654

## 2019-06-10 NOTE — PROGRESS NOTES
RADIOLOGY PROCEDURE NOTE  Patient name: Noelle Queen  MRN: [id number]  : 1965    Pre-procedure diagnosis: low back pain, history of L4-S1 fusion  Post-procedure diagnosis: Same    Procedure Date/Time: Vaishnavi 10, 2019  3:30 PM  Procedure: lumbar myelogram  Estimated blood loss: None  Specimen(s) collected with description: none  The patient tolerated the procedure well with no immediate complications.  Significant findings: per ordering provider and patient request, 14 mL clear CSF was aspirated and discarded prior to infusion of 14mL of intrathecal contrast    See imaging dictation for procedural details.    Provider name: Rut Gomez  Assistant(s):None

## 2019-06-10 NOTE — DISCHARGE INSTRUCTIONS
Myelogram Discharge Instructions     After you go home:      You may resume your normal diet    Continue to drink at least 8 ounces of fluid every 1-2 hours until bedtime tonight and continue to drink extra fluids for the next 2 days    Caffeinated beverages may help prevent or reduce spinal headaches    Care of Puncture Site:      If there is a bandaid on your back - you may remove it tomorrow morning    You may shower tomorrow    No tub baths, whirlpools or swimming for 3 days     Activity - To help prevent spinal headache or spinal fluid leakage:      Minimize your activity today. Bedrest for 24 hrs is strongly suggested as this will help to prevent a spinal headache.  You can be up to the bathroom and for meals.    Keep your head up on extra pillows while in bed today    Resume normal activities tomorrow.     Avoid strenuous activity for the next 2 days    Do not drive a motor vehicle until tomorrow morning    Medicines:      You may resume all medications    For minor pain, you may take Acetaminophen (Tylenol) or Ibuprofen (Advil)            Call the doctor who ordered this test if:      Your headache becomes worse or is severe. (A minor headache is not unusual)    You have nausea or vomiting    You develop a stiff neck    The site is red, swollen, hot or tender    You have chills or a fever greater than 101 F (38 C)    Any questions or concerns      If you have questions call:        [facility] @ [phone]

## 2019-06-13 NOTE — TELEPHONE ENCOUNTER
Health Call Center    Phone Message    May a detailed message be left on voicemail: yes    Reason for Call: Requesting Results   Name/type of test: Lumbar myelogram  Date of test: 6/10/19   Was test done at a location other than [facility] (Please fill in the location if not [facility])?: No    Pt would really just like the results posted in her My Chart to see.  She does not want to have to wait until her appt in July to know what was found.     Action Taken: Message routed to:  [facility] ([facility]): ortho.    See phone message.  I called pt & explained th at  has not yet seen Myelogram results since it was just completed & results will be discussed at appt. She agreed.  Tari Ulloa RN.

## 2019-07-02 NOTE — PROGRESS NOTES
[facility]  Orthopedics  Tye López MD  2019     Name: Noelle Queen  MRN: [id number]  Age: 54 year old  : 1965  Referring provider: Referred Self     Chief Complaint: Test result follow up      History of Present Illness:   Noelle Queen is a 54 year old, female who presents today for follow-up regarding bilateral lower back (L>R) and SI joint pain. The patient obtained a myelogram/CT of the lumbar spine on 6/10/2019. She presents today to discuss the results.     Today, patient reports she is progressively getting worse in terms of her low back pain.  She describes the pain as a searing hot knife in her low back, rated at 9/10 today.  Left side slightly worse than right side.  Worse with sitting, standing, walking on uneven surfaces, and descending stairs.  Better with laying down.  She has been taking Dilaudid 8 mg 4 times a day and using ice packs over her low back for relief of pain.  She reports some radicular sharp pains in both her posterior thighs since she had a CT myelogram, this comes and goes. Also states she had a flare of Parkinson's symptoms as a result of the CT myelogram.    She says her thoracic outlet syndrome is slowly improving through physical therapy exercises. She states she still has some limited ROM in her neck and weakness in bilateral arms      ALINE: 82.22   Cxjmhb10: 19  Pain scale 0-10: 9      Review of Systems:   A 10-point review of systems was obtained and is negative except for as noted in the HPI.     Physical Examination:  There were no vitals taken for this visit.  Constitutional - Patient is healthy, well-nourished and appears stated age.  Respiratory - Patient is breathing normally and in no respiratory distress.  Skin - No suspicious rashes or lesions. Surgical incisions are well-healed.   Psychiatric - Normal mood and affect.  Cardiovascular - Peripheral pulses are normal.  Eyes - Visual acuity is normal to the written word.  ENT  - Hearing intact to the spoken word.  Musculoskeletal - Non-antalgic gait without use of assistive devices. Midline lumbar surgical scar well healed.  Acutely tender to palpation along midline surgical scar.  Moderately tender to palpation of the right and left PSIS.  Full ROM lumbar spine.  Strength 5/5 bilateral lower extremities.  SILT bilateral lower extremities.    SI provocation tests:    Right Left   Imer Finger Test  + +   VANIA  - -   Thigh Thrust: + +   Pelvic Compression Test  - -   Palpation  + +   Pelvic Gapping  - -   Gaenslen s Test  - +   Sacral Thrust (SI) - -   I  Imaging:  Radiographs of the EOS full spine - AP/Lateral views (07/02/2019)  Stable L4-S1 fusion and hardware without evidence of loosening. No evidence of complication above hardware construct. Neutral coronal and sagittal balance    XR myelography of the lumbar spine (6/10/2019)  Technically successful procedure without complications.  CT  exam to be dictated separately.    CT scan of the lumbar spine without contrast (6/10/2019)  1. L4-S1 solid anterior and posterior spinal fusion with no evidence  of complication.  2. Otherwise normal CT myelogram of the lower thoracic and the lumbar  spine.  3. Mild degeneration of the right sacroiliac joint.  4. Diffuse fatty infiltration of the liver.  5. Calcifications in the pancreas suggesting chronic calcific  pancreatitis. This is unchanged compared with a CT scan of 3/31/2015  from [facility].    I have independently reviewed the above imaging studies; the results were discussed with the patient.     Assessment:   54 year old, female with chronic low back pain (L>R), sacralgia, and SI joint pain.     Plan:   Patient not clearly symptomatic on exam of her Sacroiliac joints, which indicates that she is not a great surgical candidate for SI joint fusion. Need to have greater than 3 positive exam findings to have a good chance of success with SI joint fusion surgery. Counseled patient  that without a clear source of her pain, it is possible that doing a surgery would do her more harm than good, and would likely not relieve her current pain symptoms, including sacralgia. CT myelogram as described above demonstrated no significant pathology or nerve compression to explain her current symptoms. Her current pain is likely due to irritation of nerves during previous surgery that have not resolved. This is something that cannot be fixed with surgery, and will need to be addressed by Pain Management clinic. Patient verbalized understanding with all this and was agreeable to following pain management for recommendations on how to control her low back pain.   - continue with Pain Management. Could try RFA for pain management; defer to pain management of chronic LBP  - follow up CR Burch PA-C on 7/3/2019 at 2:23 PM  I saw and evaluated the patient and developed the plan.  Tye López MD           Answers for HPI/ROS submitted by the patient on 7/2/2019   General Symptoms: No  Skin Symptoms: No  HENT Symptoms: Yes  EYE SYMPTOMS: No  HEART SYMPTOMS: No  LUNG SYMPTOMS: Yes  INTESTINAL SYMPTOMS: Yes  URINARY SYMPTOMS: Yes  GYNECOLOGIC SYMPTOMS: No  BREAST SYMPTOMS: No  SKELETAL SYMPTOMS: Yes  BLOOD SYMPTOMS: No  NERVOUS SYSTEM SYMPTOMS: Yes  MENTAL HEALTH SYMPTOMS: Yes  Ear pain: No  Ear discharge: No  Hearing loss: No  Tinnitus: Yes  Nosebleeds: No  Congestion: Yes  Sinus pain: Yes  Trouble swallowing: Yes   Voice hoarseness: Yes  Mouth sores: No  Sore throat: No  Tooth pain: No  Gum tenderness: No  Bleeding gums: No  Change in taste: No  Change in sense of smell: No  Dry mouth: No  Hearing aid used: No  Neck lump: No  Cough: No  Sputum or phlegm: No  Coughing up blood: No  Difficulty breating or shortness of breath: Yes  Snoring: No  Wheezing: Yes  Difficulty breathing on exertion: No  Nighttime Cough: No  Difficulty breathing when lying flat: No  Heart burn or indigestion: No  Nausea:  Yes  Vomiting: Yes  Abdominal pain: Yes  Bloating: Yes  Constipation: Yes  Diarrhea: No  Blood in stool: No  Black stools: No  Rectal or Anal pain: No  Fecal incontinence: No  Yellowing of skin or eyes: No  Vomit with blood: No  Change in stools: No  Trouble holding urine or incontinence: Yes  Pain or burning: No  Trouble starting or stopping: No  Increased frequency of urination: No  Blood in urine: No  Decreased frequency of urination: No  Frequent nighttime urination: No  Flank pain: No  Difficulty emptying bladder: No  Back pain: Yes  Muscle aches: Yes  Neck pain: Yes  Swollen joints: Yes  Joint pain: Yes  Bone pain: No  Muscle cramps: Yes  Muscle weakness: Yes  Joint stiffness: Yes  Bone fracture: No  Trouble with coordination: Yes  Dizziness or trouble with balance: Yes  Fainting or black-out spells: Yes  Memory loss: No  Headache: No  Seizures: No  Speech problems: No  Tingling: Yes  Tremor: Yes  Weakness: Yes  Difficulty walking: Yes  Paralysis: No  Numbness: Yes  Nervous or Anxious: No  Depression: Yes  Trouble sleeping: Yes  Trouble thinking or concentrating: No  Mood changes: Yes  Panic attacks: No

## 2019-07-03 NOTE — NURSING NOTE
"Reason For Visit:   Chief Complaint   Patient presents with     RECHECK     follow up thoracic outlet syndrom and follow up myelogram        Primary MD: Isaias Mcmahon  Ref. MD: Self  Date of surgery: none   Type of surgery: none .  Smoker: No  Request smoking cessation information: No    Ht 1.854 m (6' 1\")   Wt 95.3 kg (210 lb)   BMI 27.71 kg/m      Pain Assessment  Patient Currently in Pain: Yes  0-10 Pain Scale: 9    Oswestry (ALINE) Questionnaire    OSWESTRY DISABILITY INDEX 7/2/2019   Count 9   Sum 37   Oswestry Score (%) 82.22   Some recent data might be hidden            Neck Disability Index (NDI) Questionnaire    No flowsheet data found.                Promis 10 Assessment    PROMIS 10 7/2/2019   In general, would you say your health is: Fair   In general, would you say your quality of life is: Fair   In general, how would you rate your physical health? Fair   In general, how would you rate your mental health, including your mood and your ability to think? Very good   In general, how would you rate your satisfaction with your social activities and relationships? Poor   In general, please rate how well you carry out your usual social activities and roles Poor   To what extent are you able to carry out your everyday physical activities such as walking, climbing stairs, carrying groceries, or moving a chair? A little   How often have you been bothered by emotional problems such as feeling anxious, depressed or irritable? Sometimes   How would you rate your fatigue on average? Moderate   How would you rate your pain on average?   0 = No Pain  to  10 = Worst Imaginable Pain 8   Global Physical Health Score : Raw Score -   Global Mental Health Score : Raw Score -   Total (Physical + Mental Health Score) -   In general, would you say your health is: 2   In general, would you say your quality of life is: 2   In general, how would you rate your physical health? 2   In general, how would you rate your mental " health, including your mood and your ability to think? 4   In general, how would you rate your satisfaction with your social activities and relationships? 1   In general, please rate how well you carry out your usual social activities and roles. (This includes activities at home, at work and in your community, and responsibilities as a parent, child, spouse, employee, friend, etc.) 1   To what extent are you able to carry out your everyday physical activities such as walking, climbing stairs, carrying groceries, or moving a chair? 2   In the past 7 days, how often have you been bothered by emotional problems such as feeling anxious, depressed, or irritable? 3   In the past 7 days, how would you rate your fatigue on average? 3   In the past 7 days, how would you rate your pain on average, where 0 means no pain, and 10 means worst imaginable pain? 8   Global Mental Health Score 10   Global Physical Health Score 9   PROMIS TOTAL - SUBSCORES 19   Some recent data might be hidden                Phuc Brody ATC

## 2019-07-03 NOTE — LETTER
RE: Noelle Queen  [address]  [place] [address]     Dear Colleague,    Thank you for referring your patient, Noelle Queen, to the [facility]. Please see a copy of my visit note below.    [facility]  Orthopedics  Tye López MD  2019     Name: Noelle Queen  MRN: [id number]  Age: 54 year old  : 1965  Referring provider: Referred Self     Chief Complaint: Test result follow up    History of Present Illness:   Noelle Queen is a 54 year old, female who presents today for follow-up regarding bilateral lower back (L>R) and SI joint pain. The patient obtained a myelogram/CT of the lumbar spine on 6/10/2019. She presents today to discuss the results.     Today, patient reports she is progressively getting worse in terms of her low back pain.  She describes the pain as a searing hot knife in her low back, rated at 9/10 today.  Left side slightly worse than right side.  Worse with sitting, standing, walking on uneven surfaces, and descending stairs.  Better with laying down.  She has been taking Dilaudid 8 mg 4 times a day and using ice packs over her low back for relief of pain.  She reports some radicular sharp pains in both her posterior thighs since she had a CT myelogram, this comes and goes. Also states she had a flare of Parkinson's symptoms as a result of the CT myelogram.    She says her thoracic outlet syndrome is slowly improving through physical therapy exercises. She states she still has some limited ROM in her neck and weakness in bilateral arms      ALINE: 82.22   Lybena37: 19  Pain scale 0-10: 9      Review of Systems:   A 10-point review of systems was obtained and is negative except for as noted in the HPI.     Physical Examination:  There were no vitals taken for this visit.  Constitutional - Patient is healthy, well-nourished and appears stated  age.  Respiratory - Patient is breathing normally and in no respiratory distress.  Skin - No suspicious rashes or lesions. Surgical incisions are well-healed.   Psychiatric - Normal mood and affect.  Cardiovascular - Peripheral pulses are normal.  Eyes - Visual acuity is normal to the written word.  ENT - Hearing intact to the spoken word.  Musculoskeletal - Non-antalgic gait without use of assistive devices. Midline lumbar surgical scar well healed.  Acutely tender to palpation along midline surgical scar.  Moderately tender to palpation of the right and left PSIS.  Full ROM lumbar spine.  Strength 5/5 bilateral lower extremities.  SILT bilateral lower extremities.    SI provocation tests:    Right Left   Imer Finger Test  + +   VANIA  - -   Thigh Thrust: + +   Pelvic Compression Test  - -   Palpation  + +   Pelvic Gapping  - -   Gaenslen s Test  - +   Sacral Thrust (SI) - -   I  Imaging:  Radiographs of the EOS full spine - AP/Lateral views (07/02/2019)  Stable L4-S1 fusion and hardware without evidence of loosening. No evidence of complication above hardware construct. Neutral coronal and sagittal balance    XR myelography of the lumbar spine (6/10/2019)  Technically successful procedure without complications.  CT  exam to be dictated separately.    CT scan of the lumbar spine without contrast (6/10/2019)  1. L4-S1 solid anterior and posterior spinal fusion with no evidence  of complication.  2. Otherwise normal CT myelogram of the lower thoracic and the lumbar  spine.  3. Mild degeneration of the right sacroiliac joint.  4. Diffuse fatty infiltration of the liver.  5. Calcifications in the pancreas suggesting chronic calcific  pancreatitis. This is unchanged compared with a CT scan of 3/31/2015  from [facility].    I have independently reviewed the above imaging studies; the results were discussed with the patient.     Assessment:   54 year old, female with chronic low back pain (L>R), sacralgia, and  SI joint pain.     Plan:   Patient not clearly symptomatic on exam of her Sacroiliac joints, which indicates that she is not a great surgical candidate for SI joint fusion. Need to have greater than 3 positive exam findings to have a good chance of success with SI joint fusion surgery. Counseled patient that without a clear source of her pain, it is possible that doing a surgery would do her more harm than good, and would likely not relieve her current pain symptoms, including sacralgia. CT myelogram as described above demonstrated no significant pathology or nerve compression to explain her current symptoms. Her current pain is likely due to irritation of nerves during previous surgery that have not resolved. This is something that cannot be fixed with surgery, and will need to be addressed by Pain Management clinic. Patient verbalized understanding with all this and was agreeable to following pain management for recommendations on how to control her low back pain.   - continue with Pain Management. Could try RFA for pain management; defer to pain management of chronic LBP  - follow up CR Burch PA-C on 7/3/2019 at 2:23 PM    I saw and evaluated the patient and developed the plan.    Tye López MD

## 2020-01-01 ENCOUNTER — AMBULATORY - HEALTHEAST (OUTPATIENT)
Dept: GERIATRICS | Facility: CLINIC | Age: 55
End: 2020-01-01

## 2020-01-01 ENCOUNTER — OFFICE VISIT - HEALTHEAST (OUTPATIENT)
Dept: GERIATRICS | Facility: CLINIC | Age: 55
End: 2020-01-01

## 2020-01-01 ENCOUNTER — COMMUNICATION - HEALTHEAST (OUTPATIENT)
Dept: GERIATRICS | Facility: CLINIC | Age: 55
End: 2020-01-01

## 2020-01-01 DIAGNOSIS — D53.9 MACROCYTIC ANEMIA: ICD-10-CM

## 2020-01-01 DIAGNOSIS — K70.31 ALCOHOLIC CIRRHOSIS OF LIVER WITH ASCITES (H): ICD-10-CM

## 2020-01-01 DIAGNOSIS — G89.4 CHRONIC PAIN SYNDROME: ICD-10-CM

## 2020-01-01 DIAGNOSIS — A41.9 SEPSIS, DUE TO UNSPECIFIED ORGANISM, UNSPECIFIED WHETHER ACUTE ORGAN DYSFUNCTION PRESENT (H): ICD-10-CM

## 2020-01-01 DIAGNOSIS — L03.115 CELLULITIS OF RIGHT LOWER EXTREMITY: ICD-10-CM

## 2020-03-15 ENCOUNTER — HEALTH MAINTENANCE LETTER (OUTPATIENT)
Age: 55
End: 2020-03-15

## 2021-01-14 ENCOUNTER — HEALTH MAINTENANCE LETTER (OUTPATIENT)
Age: 56
End: 2021-01-14

## 2021-05-28 ENCOUNTER — RECORDS - HEALTHEAST (OUTPATIENT)
Dept: ADMINISTRATIVE | Facility: CLINIC | Age: 56
End: 2021-05-28

## 2021-06-02 ENCOUNTER — RECORDS - HEALTHEAST (OUTPATIENT)
Dept: ADMINISTRATIVE | Facility: CLINIC | Age: 56
End: 2021-06-02

## 2021-06-03 ENCOUNTER — RECORDS - HEALTHEAST (OUTPATIENT)
Dept: ADMINISTRATIVE | Facility: CLINIC | Age: 56
End: 2021-06-03

## 2021-06-04 VITALS
BODY MASS INDEX: 31.12 KG/M2 | OXYGEN SATURATION: 95 % | SYSTOLIC BLOOD PRESSURE: 107 MMHG | RESPIRATION RATE: 18 BRPM | HEART RATE: 91 BPM | WEIGHT: 242.4 LBS | DIASTOLIC BLOOD PRESSURE: 65 MMHG | TEMPERATURE: 98 F

## 2021-06-05 NOTE — PROGRESS NOTES
[facility]    NAME:  Noelle Queen             :  1965  MRN: [id number]  CODE STATUS:  FULL CODE    FACILITY:  [facility] [[id number]]         Chief Complaint   Patient presents with     Problem Visit     Hepatic encephalopathy     HISTORY OF PRESENT ILLNESS: Noelle Queen is a 54 y.o. female with PMH significant for Parkinson's disease, chronic pain, and alcoholic liver disease with cirrhosis who was admitted for encephalopathy after being found down at home.     #Encephalopathy, likely metabolic 2/2 alcoholic hepatitis - resolved  #Alcoholic liver disease with cirrhosis  Patient found on the floor in her home by a neighbor and was reportedly altered.  VSS on admission, notable labs - mild leukocytosis of 14.1, Lactate 2.5 -> 2.3 ->1.2, Ammonia 102, , ALT 74, Total Bili 9.9, Glucose 154, VBG (pH 7.46, CO2 43, O2 33, HCO3 28.9), UA negative for infection, lipase 11, blood alcohol negative, CK from 1/4 AM not elevated, UDS positive for benzodiazepines, opiates, and oxycodone.  CT abd/pelv showed cirrhosis and splenomegaly consistent with portal venous hypertension, ascites, chronic pancreatitis, constipation, right pleural effusion which is increased in size, subcutaneous edema.  CT head negative.  Paracentesis on admission obtained 0.5L of fluid which was negative for SBP.  Was recently admitted -12/15 due to hepatic encephalopathy and discharged home with home care (declined other dispositions).   Encephalopathy most likely metabolic secondary to her alcohololic hepatitis - she denies recent alcohol use (last over the summer 2019) and alcohol negative on admission.  Most likely had been noncompliant with her lactulose and other medications.  High, intentional doses of opioids may be contributing as well although she states she was only using BID.  During her last hospitalization, Valium was discontinued although positive on her UDS so may  "still be taking.  MELD score 30 on admission.  Patient cleared remarkable after restarting her lactulose and other medications.  -repeat BMP in 3 days given we have \"restarted\" her diuretics, labs stable in the hospital but most likely hadn't been taking at home  -outpatient liver clinic f/u for EGD through [facility]     #Sepsis - resolved  #Bactermia 2/2 acinetobacter iwoffii  #Cellulitis/Ulcers  Patient started on empiric IV antibiotics with +BC, leukocytosis, and AMS.  Workup negative for SBP.  UA did not indicate infection, VSS.  Multiple, chronic wounds on bilateral lower extremities which could be contributing to the above.  Blood culture growing the above.  Vanco discontinued overnight 1/5, Zosyn 1/6 AM.  ID evaluated 1/6, CXR negative - 10 day course of PO Cipro.     #Chronic pain syndrome  Follows with Dr. La in a pain clinic.  PTA medications verified via  - PO Dilaudid 2 mg tablets with a maximum of 10 tablets daily.  Last prescription for Valium 10/14/2019 for 30 tablets although patient may still have a supply.  Given encephalopathy, Dilaudid not restarted on admission.  As patient cleared, she requested her Dilaudid although only two times a day which is much lower than it looks like she was taking at home so ordered this PRN.  She finds it more helpful to take in the AM so ordered Q4H PRN with a max dose of 2/day.  No further Valium, Propranolol available PRN for dystonia.  Also takes Amitriptyline, dose increased to 75mg 12/19 so reordered.      #WENDY - resolved  Cr 1.73 on admission, normal kidney function at baseline.  Repeat 0.88.  CK normal 1/4.  Possibly 2/2 decreased PO intake and possible infection.  Normal again 1/6 and 1/7.     #Parkinson's Disease  Previous notes state she uses valium for dystonia, particularly with playing violin.  Again, was discontinued at last hospital discharge but she may still have a supply.     #Macrocytic anemia  Hgb 12.6 on admission, MCV " 106.  Consistent with alcoholic liver disease.  Previous work-up of Vit B12, Folate, Ferritin is consistent with this.      #Complex social situation  Patient seems currently unable to care for herself at home, discussed with her half brother, Baldev and he agrees the TCU/LTC is best at this time to prevent her from frequent hospitalizations. I discussed options with the patient, she is agreeable to TCU at this time but not LTC.     No past medical history on file.     Past Surgical History:   Procedure Laterality Date     US PARACENTESIS  12/12/2019     US PARACENTESIS  1/3/2020     No family history on file.  Social History     Socioeconomic History     Marital status: Single     Spouse name: Not on file     Number of children: Not on file     Years of education: Not on file     Highest education level: Not on file   Occupational History     Not on file   Social Needs     Financial resource strain: Not on file     Food insecurity:     Worry: Not on file     Inability: Not on file     Transportation needs:     Medical: Not on file     Non-medical: Not on file   Tobacco Use     Smoking status: Former Smoker     Smokeless tobacco: Never Used   Substance and Sexual Activity     Alcohol use: Not Currently     Comment: Pt's last drink was November     Drug use: Never     Sexual activity: Not Currently   Lifestyle     Physical activity:     Days per week: Not on file     Minutes per session: Not on file     Stress: Not on file   Relationships     Social connections:     Talks on phone: Not on file     Gets together: Not on file     Attends Yazidi service: Not on file     Active member of club or organization: Not on file     Attends meetings of clubs or organizations: Not on file     Relationship status: Not on file     Intimate partner violence:     Fear of current or ex partner: Not on file     Emotionally abused: Not on file     Physically abused: Not on file     Forced sexual activity: Not on file   Other Topics  "Concern     Not on file   Social History Narrative    1/3/2020: Noelle lives alone. Her neighbor is her primary caregiver. He states there are \"bugs and garbage\" all over her house and is hoping to get power of  over her to help move her to a nursing home. Noelle has a brother who lives in [place] who she does not talk to. She does not drink alcohol.     Allergies   Allergen Reactions     Acetaminophen Swelling     Patient stated face swelled up while at a TCU       Current Outpatient Medications   Medication Sig Dispense Refill     carbidopa-levodopa (SINEMET CR)  mg ER tablet Take 1 tablet by mouth every evening. 30 tablet 0     carbidopa-levodopa (SINEMET)  mg per tablet Take 1 tablet by mouth 3 (three) times a day. Different dose at bedtime 90 tablet 0     ciprofloxacin HCl (CIPRO) 500 MG tablet Take 1 tablet (500 mg total) by mouth 2 times a day at 6:00 am and 4:00 pm for 9 days. 18 tablet 0     folic acid (FOLVITE) 1 MG tablet Take 1 tablet (1 mg total) by mouth daily. 30 tablet 0     furosemide (LASIX) 40 MG tablet Take 1 tablet (40 mg total) by mouth daily. 30 tablet 0     HYDROmorphone (DILAUDID) 2 MG tablet Take 1 tablet (2 mg total) by mouth 2 (two) times a day as needed. 14 tablet 0     lactulose (ENULOSE) 20 gram/30 mL Soln solution Take 60 mL (40 g total) by mouth 4 (four) times a day. Hold if >3 bowel movements in a day. 7 Bottle 0     lipase-protease-amylase (ZENPEP) 5,000-17,000- 24,000 unit CpDR capsule Take 2 capsules (10,000 units of lipase total) by mouth 3 (three) times a day with meals. 180 capsule 0     nortriptyline (PAMELOR) 75 MG capsule Take 1 capsule (75 mg total) by mouth at bedtime. 30 capsule 0     ondansetron (ZOFRAN-ODT) 8 MG disintegrating tablet Take 1 tablet (8 mg total) by mouth every 8 (eight) hours as needed for nausea. 30 tablet 0     propranolol (INDERAL) 10 MG tablet Take 1 tablet (10 mg total) by mouth daily as needed (dystonia). 7 tablet 0     " rifAXIMin (XIFAXAN) 550 mg Tab tablet Take 1 tablet (550 mg total) by mouth 2 (two) times a day. 30 tablet 0     spironolactone (ALDACTONE) 100 MG tablet Take 1 tablet (100 mg total) by mouth daily. 30 tablet 0     tiotropium-olodaterol (STIOLTO RESPIMAT) 2.5-2.5 mcg/actuation Mist inhaler Inhale 2 Inhalation daily. 4 g 0     No current facility-administered medications for this visit.        REVIEW OF SYSTEMS:    Currently, no fever, chills, or rigors. Does not have any visual or hearing problems. Denies any chest pain, headaches, palpitations, lightheadedness, dizziness, shortness of breath, or cough. Appetite is good. Denies any GERD symptoms. Denies any difficulty with swallowing, nausea, or vomiting.  Denies any abdominal pain, diarrhea or constipation. Denies any urinary symptoms. No insomnia. No active bleeding. No rash.       PHYSICAL EXAMINATION:  Vitals:    01/13/20 2101   BP: 107/65   Pulse: 91   Resp: 18   Temp: 98  F (36.7  C)   SpO2: 95%   Weight: (!) 242 lb 6.4 oz (110 kg)       GENERAL: Awake, Alert, oriented x3, not in any form of acute distress, answers questions appropriately, follows simple commands, conversant  HEENT: Head is normocephalic with normal hair distribution. No evidence of trauma. Ears: No acute purulent discharge. Eyes: Conjunctivae pink with no scleral jaundice. Nose: Normal mucosa and septum. NECK: Supple with no cervical or supraclavicular lymphadenopathy. Trachea is midline.   CHEST: No tenderness or deformity, no crepitus  LUNG: Clear to auscultation with good chest expansion. There are no crackles or wheezes, normal AP diameter.  BACK: No kyphosis of the thoracic spine. Symmetric, no curvature, ROM normal, no CVA tenderness, no spinal tenderness   CVS: There is good S1  S2, there are no murmurs, rubs, gallops, or heaves, rhythm is regular,  2+ pulses symmetric in all extremities.  ABDOMEN: Globular and soft, nontender to palpation, non distended, no masses, no organomegaly,  good bowel sounds, no rebound or guarding, no peritoneal signs.   EXTREMITIES:  Full range of motion on both upper and lower extremities, there is no tenderness to palpation, no pedal edema, no cyanosis or clubbing, no calf tenderness.  Pulses equal in all extremities, normal cap refill, no joint swelling.  SKIN: Warm and dry, no erythema noted.  Skin color, texture, no rashes or lesions.  NEUROLOGICAL: The patient is oriented to person, place and time. Strength and sensation are grossly intact. Face is symmetric.    LABS:      Lab Results   Component Value Date    WBC 7.7 01/06/2020    HGB 12.3 01/06/2020    HCT 36.1 01/06/2020     (H) 01/06/2020    PLT 85 (L) 01/06/2020     Results for orders placed or performed during the hospital encounter of 01/03/20   Basic Metabolic Panel   Result Value Ref Range    Sodium 137 136 - 145 mmol/L    Potassium 4.0 3.5 - 5.0 mmol/L    Chloride 106 98 - 107 mmol/L    CO2 22 22 - 31 mmol/L    Anion Gap, Calculation 9 5 - 18 mmol/L    Glucose 135 (H) 70 - 125 mg/dL    Calcium 7.8 (L) 8.5 - 10.5 mg/dL    BUN 23 (H) 8 - 22 mg/dL    Creatinine 0.88 0.60 - 1.10 mg/dL    GFR MDRD Af Amer >60 >60 mL/min/1.73m2    GFR MDRD Non Af Amer >60 >60 mL/min/1.73m2     Lab Results   Component Value Date    [id number] >2,000 (H) 12/13/2019       ASSESSMENT/PLAN:    1. Alcoholic cirrhosis of liver with ascites (H)    2. Cellulitis of right lower extremity    3. Sepsis, due to unspecified organism, unspecified whether acute organ dysfunction present (H)    4. Chronic pain syndrome    5. Macrocytic anemia                Electronically signed by:  Roxanna Durbin CNP    Total time spent on the unit was 40 minutes of which 25 minutes was spent in counseling  and coordination of care of the above plan with nursing staff, patient, and therapy.

## 2021-06-05 NOTE — TELEPHONE ENCOUNTER
"[facility] Nurse Triage Telephone Note      Provider: SVETA Dasilva  Facility: [facility]    Facility Type: TCU    Caller: Stephanie Raygoza Back Number:  [phone]    Allergies: Acetaminophen    Reason for call: Pt C/O non-productive cough X 2 days. Using house cough med. VSS, Afeb. She states she has \"thrown up\", not observed by nursing.  RA sat 90-93.  She also states that she is a diabetic, we don't have that listed a a DX for her & not on any meds for diabetes.    Verbal Order/Direction given by Provider: You can check her BG on your own just to check. Enc fluids, monitor her symptoms, T, cough, & document her complaints.     Provider giving order: SVETA Dasilva    Verbal order given to: Stephanie Templeton RN      "

## 2021-06-20 NOTE — LETTER
Letter by Roxanna Durbin CNP at      Author: Roxanna Durbin CNP Service: -- Author Type: --    Filed:  Encounter Date: 2020 Status: Signed         Patient: Noelle Queen   MR Number: [id number]   YOB: 1965   Date of Visit: 2020          [facility]    NAME:  Noelle Queen             :  1965  MRN: [id number]  CODE STATUS:  FULL CODE    FACILITY:  [facility] [[id number]]         Chief Complaint   Patient presents with   ? Problem Visit     Hepatic encephalopathy     HISTORY OF PRESENT ILLNESS: Noelle Queen is a 54 y.o. female with PMH significant for Parkinson's disease, chronic pain, and alcoholic liver disease with cirrhosis who was admitted for encephalopathy after being found down at home.     #Encephalopathy, likely metabolic 2/2 alcoholic hepatitis - resolved  #Alcoholic liver disease with cirrhosis  Patient found on the floor in her home by a neighbor and was reportedly altered.  VSS on admission, notable labs - mild leukocytosis of 14.1, Lactate 2.5 -> 2.3 ->1.2, Ammonia 102, , ALT 74, Total Bili 9.9, Glucose 154, VBG (pH 7.46, CO2 43, O2 33, HCO3 28.9), UA negative for infection, lipase 11, blood alcohol negative, CK from 1/4 AM not elevated, UDS positive for benzodiazepines, opiates, and oxycodone.  CT abd/pelv showed cirrhosis and splenomegaly consistent with portal venous hypertension, ascites, chronic pancreatitis, constipation, right pleural effusion which is increased in size, subcutaneous edema.  CT head negative.  Paracentesis on admission obtained 0.5L of fluid which was negative for SBP.  Was recently admitted -12/15 due to hepatic encephalopathy and discharged home with home care (declined other dispositions).   Encephalopathy most likely metabolic secondary to her alcohololic hepatitis - she denies recent alcohol use (last over the summer 2019) and alcohol negative on  "admission.  Most likely had been noncompliant with her lactulose and other medications.  High, intentional doses of opioids may be contributing as well although she states she was only using BID.  During her last hospitalization, Valium was discontinued although positive on her UDS so may still be taking.  MELD score 30 on admission.  Patient cleared remarkable after restarting her lactulose and other medications.  -repeat BMP in 3 days given we have \"restarted\" her diuretics, labs stable in the hospital but most likely hadn't been taking at home  -outpatient liver clinic f/u for EGD through [facility]     #Sepsis - resolved  #Bactermia 2/2 acinetobacter iwoffii  #Cellulitis/Ulcers  Patient started on empiric IV antibiotics with +BC, leukocytosis, and AMS.  Workup negative for SBP.  UA did not indicate infection, VSS.  Multiple, chronic wounds on bilateral lower extremities which could be contributing to the above.  Blood culture growing the above.  Vanco discontinued overnight 1/5, Zosyn 1/6 AM.  ID evaluated 1/6, CXR negative - 10 day course of PO Cipro.     #Chronic pain syndrome  Follows with Dr. La in a pain clinic.  PTA medications verified via  - PO Dilaudid 2 mg tablets with a maximum of 10 tablets daily.  Last prescription for Valium 10/14/2019 for 30 tablets although patient may still have a supply.  Given encephalopathy, Dilaudid not restarted on admission.  As patient cleared, she requested her Dilaudid although only two times a day which is much lower than it looks like she was taking at home so ordered this PRN.  She finds it more helpful to take in the AM so ordered Q4H PRN with a max dose of 2/day.  No further Valium, Propranolol available PRN for dystonia.  Also takes Amitriptyline, dose increased to 75mg 12/19 so reordered.      #WENDY - resolved  Cr 1.73 on admission, normal kidney function at baseline.  Repeat 0.88.  CK normal 1/4.  Possibly 2/2 decreased PO intake and possible " infection.  Normal again 1/6 and 1/7.     #Parkinson's Disease  Previous notes state she uses valium for dystonia, particularly with playing violin.  Again, was discontinued at last hospital discharge but she may still have a supply.     #Macrocytic anemia  Hgb 12.6 on admission, .  Consistent with alcoholic liver disease.  Previous work-up of Vit B12, Folate, Ferritin is consistent with this.      #Complex social situation  Patient seems currently unable to care for herself at home, discussed with her half brother, Baldev and he agrees the TCU/LTC is best at this time to prevent her from frequent hospitalizations. I discussed options with the patient, she is agreeable to TCU at this time but not LTC.     No past medical history on file.     Past Surgical History:   Procedure Laterality Date   ?  PARACENTESIS  12/12/2019   ?  PARACENTESIS  1/3/2020     No family history on file.  Social History     Socioeconomic History   ? Marital status: Single     Spouse name: Not on file   ? Number of children: Not on file   ? Years of education: Not on file   ? Highest education level: Not on file   Occupational History   ? Not on file   Social Needs   ? Financial resource strain: Not on file   ? Food insecurity:     Worry: Not on file     Inability: Not on file   ? Transportation needs:     Medical: Not on file     Non-medical: Not on file   Tobacco Use   ? Smoking status: Former Smoker   ? Smokeless tobacco: Never Used   Substance and Sexual Activity   ? Alcohol use: Not Currently     Comment: Pt's last drink was November   ? Drug use: Never   ? Sexual activity: Not Currently   Lifestyle   ? Physical activity:     Days per week: Not on file     Minutes per session: Not on file   ? Stress: Not on file   Relationships   ? Social connections:     Talks on phone: Not on file     Gets together: Not on file     Attends Jain service: Not on file     Active member of club or organization: Not on file     Attends  "meetings of clubs or organizations: Not on file     Relationship status: Not on file   ? Intimate partner violence:     Fear of current or ex partner: Not on file     Emotionally abused: Not on file     Physically abused: Not on file     Forced sexual activity: Not on file   Other Topics Concern   ? Not on file   Social History Narrative    1/3/2020: Noelle lives alone. Her neighbor is her primary caregiver. He states there are \"bugs and garbage\" all over her house and is hoping to get power of  over her to help move her to a nursing home. Noelle has a brother who lives in [place] who she does not talk to. She does not drink alcohol.     Allergies   Allergen Reactions   ? Acetaminophen Swelling     Patient stated face swelled up while at a TCU       Current Outpatient Medications   Medication Sig Dispense Refill   ? carbidopa-levodopa (SINEMET CR)  mg ER tablet Take 1 tablet by mouth every evening. 30 tablet 0   ? carbidopa-levodopa (SINEMET)  mg per tablet Take 1 tablet by mouth 3 (three) times a day. Different dose at bedtime 90 tablet 0   ? ciprofloxacin HCl (CIPRO) 500 MG tablet Take 1 tablet (500 mg total) by mouth 2 times a day at 6:00 am and 4:00 pm for 9 days. 18 tablet 0   ? folic acid (FOLVITE) 1 MG tablet Take 1 tablet (1 mg total) by mouth daily. 30 tablet 0   ? furosemide (LASIX) 40 MG tablet Take 1 tablet (40 mg total) by mouth daily. 30 tablet 0   ? HYDROmorphone (DILAUDID) 2 MG tablet Take 1 tablet (2 mg total) by mouth 2 (two) times a day as needed. 14 tablet 0   ? lactulose (ENULOSE) 20 gram/30 mL Soln solution Take 60 mL (40 g total) by mouth 4 (four) times a day. Hold if >3 bowel movements in a day. 7 Bottle 0   ? lipase-protease-amylase (ZENPEP) 5,000-17,000- 24,000 unit CpDR capsule Take 2 capsules (10,000 units of lipase total) by mouth 3 (three) times a day with meals. 180 capsule 0   ? nortriptyline (PAMELOR) 75 MG capsule Take 1 capsule (75 mg total) by mouth at bedtime. " 30 capsule 0   ? ondansetron (ZOFRAN-ODT) 8 MG disintegrating tablet Take 1 tablet (8 mg total) by mouth every 8 (eight) hours as needed for nausea. 30 tablet 0   ? propranolol (INDERAL) 10 MG tablet Take 1 tablet (10 mg total) by mouth daily as needed (dystonia). 7 tablet 0   ? rifAXIMin (XIFAXAN) 550 mg Tab tablet Take 1 tablet (550 mg total) by mouth 2 (two) times a day. 30 tablet 0   ? spironolactone (ALDACTONE) 100 MG tablet Take 1 tablet (100 mg total) by mouth daily. 30 tablet 0   ? tiotropium-olodaterol (STIOLTO RESPIMAT) 2.5-2.5 mcg/actuation Mist inhaler Inhale 2 Inhalation daily. 4 g 0     No current facility-administered medications for this visit.        REVIEW OF SYSTEMS:    Currently, no fever, chills, or rigors. Does not have any visual or hearing problems. Denies any chest pain, headaches, palpitations, lightheadedness, dizziness, shortness of breath, or cough. Appetite is good. Denies any GERD symptoms. Denies any difficulty with swallowing, nausea, or vomiting.  Denies any abdominal pain, diarrhea or constipation. Denies any urinary symptoms. No insomnia. No active bleeding. No rash.       PHYSICAL EXAMINATION:  Vitals:    01/13/20 2101   BP: 107/65   Pulse: 91   Resp: 18   Temp: 98  F (36.7  C)   SpO2: 95%   Weight: (!) 242 lb 6.4 oz (110 kg)       GENERAL: Awake, Alert, oriented x3, not in any form of acute distress, answers questions appropriately, follows simple commands, conversant  HEENT: Head is normocephalic with normal hair distribution. No evidence of trauma. Ears: No acute purulent discharge. Eyes: Conjunctivae pink with no scleral jaundice. Nose: Normal mucosa and septum. NECK: Supple with no cervical or supraclavicular lymphadenopathy. Trachea is midline.   CHEST: No tenderness or deformity, no crepitus  LUNG: Clear to auscultation with good chest expansion. There are no crackles or wheezes, normal AP diameter.  BACK: No kyphosis of the thoracic spine. Symmetric, no curvature, ROM  normal, no CVA tenderness, no spinal tenderness   CVS: There is good S1  S2, there are no murmurs, rubs, gallops, or heaves, rhythm is regular,  2+ pulses symmetric in all extremities.  ABDOMEN: Globular and soft, nontender to palpation, non distended, no masses, no organomegaly, good bowel sounds, no rebound or guarding, no peritoneal signs.   EXTREMITIES:  Full range of motion on both upper and lower extremities, there is no tenderness to palpation, no pedal edema, no cyanosis or clubbing, no calf tenderness.  Pulses equal in all extremities, normal cap refill, no joint swelling.  SKIN: Warm and dry, no erythema noted.  Skin color, texture, no rashes or lesions.  NEUROLOGICAL: The patient is oriented to person, place and time. Strength and sensation are grossly intact. Face is symmetric.    LABS:      Lab Results   Component Value Date    WBC 7.7 01/06/2020    HGB 12.3 01/06/2020    HCT 36.1 01/06/2020     (H) 01/06/2020    PLT 85 (L) 01/06/2020     Results for orders placed or performed during the hospital encounter of 01/03/20   Basic Metabolic Panel   Result Value Ref Range    Sodium 137 136 - 145 mmol/L    Potassium 4.0 3.5 - 5.0 mmol/L    Chloride 106 98 - 107 mmol/L    CO2 22 22 - 31 mmol/L    Anion Gap, Calculation 9 5 - 18 mmol/L    Glucose 135 (H) 70 - 125 mg/dL    Calcium 7.8 (L) 8.5 - 10.5 mg/dL    BUN 23 (H) 8 - 22 mg/dL    Creatinine 0.88 0.60 - 1.10 mg/dL    GFR MDRD Af Amer >60 >60 mL/min/1.73m2    GFR MDRD Non Af Amer >60 >60 mL/min/1.73m2     Lab Results   Component Value Date    [id number] >2,000 (H) 12/13/2019       ASSESSMENT/PLAN:    1. Alcoholic cirrhosis of liver with ascites (H)    2. Cellulitis of right lower extremity    3. Sepsis, due to unspecified organism, unspecified whether acute organ dysfunction present (H)    4. Chronic pain syndrome    5. Macrocytic anemia                Electronically signed by:  Roxanan Durbin CNP    Total time spent on the unit was 40 minutes  of which 25 minutes was spent in counseling  and coordination of care of the above plan with nursing staff, patient, and therapy.

## (undated) RX ORDER — LIDOCAINE HYDROCHLORIDE 10 MG/ML
INJECTION, SOLUTION EPIDURAL; INFILTRATION; INTRACAUDAL; PERINEURAL
Status: DISPENSED
Start: 2019-01-01